# Patient Record
Sex: FEMALE | Race: BLACK OR AFRICAN AMERICAN | Employment: OTHER | URBAN - METROPOLITAN AREA
[De-identification: names, ages, dates, MRNs, and addresses within clinical notes are randomized per-mention and may not be internally consistent; named-entity substitution may affect disease eponyms.]

---

## 2017-01-02 ENCOUNTER — TELEPHONE (OUTPATIENT)
Dept: FAMILY MEDICINE CLINIC | Age: 67
End: 2017-01-02

## 2017-01-02 DIAGNOSIS — N18.30 CKD (CHRONIC KIDNEY DISEASE) STAGE 3, GFR 30-59 ML/MIN (HCC): Primary | ICD-10-CM

## 2017-01-02 NOTE — TELEPHONE ENCOUNTER
Lipids excellent. BS normal, non diabetes range. Great job, keep up the good work. Rest of lipids all ok as well except her renal insufficiency has worsened from last few checks (creatinine has steadily been increasing over the past few years and has jumped more significantly in the past year, at its highest now and considered stage 3 chronic kidney disease). I would like to refer her to nephrology to establish care. May notify [facility] after r/w pt. I have placed order.   Patient can see me for her annual fasting CPE 1 yr and follow up sooner in the interim pending nephrology eval.

## 2017-02-27 ENCOUNTER — HOSPITAL ENCOUNTER (OUTPATIENT)
Dept: ULTRASOUND IMAGING | Age: 67
Discharge: HOME | End: 2017-02-27
Attending: INTERNAL MEDICINE
Payer: MEDICARE

## 2017-02-27 DIAGNOSIS — N18.30 CHRONIC KIDNEY DISEASE, STAGE III (MODERATE) (HCC): ICD-10-CM

## 2017-02-27 PROCEDURE — 76770 US EXAM ABDO BACK WALL COMP: CPT

## 2017-08-04 ENCOUNTER — ED HISTORICAL/CONVERTED ENCOUNTER (OUTPATIENT)
Dept: OTHER | Age: 67
End: 2017-08-04

## 2017-10-12 ENCOUNTER — OFFICE VISIT (OUTPATIENT)
Dept: FAMILY MEDICINE CLINIC | Age: 67
End: 2017-10-12

## 2017-10-12 VITALS
SYSTOLIC BLOOD PRESSURE: 138 MMHG | BODY MASS INDEX: 34.53 KG/M2 | OXYGEN SATURATION: 98 % | DIASTOLIC BLOOD PRESSURE: 70 MMHG | HEIGHT: 70 IN | HEART RATE: 69 BPM | TEMPERATURE: 98.2 F | WEIGHT: 241.2 LBS | RESPIRATION RATE: 14 BRPM

## 2017-10-12 DIAGNOSIS — M25.551 PAIN OF RIGHT HIP JOINT: Primary | ICD-10-CM

## 2017-10-12 DIAGNOSIS — G89.29 CHRONIC RIGHT-SIDED LOW BACK PAIN WITHOUT SCIATICA: ICD-10-CM

## 2017-10-12 DIAGNOSIS — E66.9 OBESITY (BMI 30.0-34.9): ICD-10-CM

## 2017-10-12 DIAGNOSIS — Z23 ENCOUNTER FOR IMMUNIZATION: ICD-10-CM

## 2017-10-12 DIAGNOSIS — M54.50 CHRONIC RIGHT-SIDED LOW BACK PAIN WITHOUT SCIATICA: ICD-10-CM

## 2017-10-12 DIAGNOSIS — M79.651 PAIN OF RIGHT THIGH: ICD-10-CM

## 2017-10-12 NOTE — PROGRESS NOTES
1. Have you been to the ER, urgent care clinic since your last visit? Hospitalized since your last visit? No    2. Have you seen or consulted any other health care providers outside of the [address] [place] [address] since your last visit? Include any pap smears or colon screening. Podiatrist Dr. Precious Ogden 6/2017 for calluses. Chief Complaint   Patient presents with    Leg Pain     right thigh pain for years- has been getting worse recently, pain comes and goes.  Abdominal Pain     umbilical pain for the last couple of months, pain comes and goes. Not fasting    Eye exam done 12/13/2016 with Dr. Marlon Mccann. Colonoscopy done 4/2017 at [facility].

## 2017-10-12 NOTE — PROGRESS NOTES
After obtaining consent, and per orders of Dr. Bronson Olvera, injection of influenza given by Taj Laird LPN. Patient instructed to remain in clinic for 20 minutes afterwards, and to report any adverse reaction to me immediately. Patient did not have any adverse reactions during this office visit.

## 2017-10-12 NOTE — MR AVS SNAPSHOT
Visit Information Date & Time Provider Department Dept. Phone Encounter #  
 10/12/2017  8:45 AM Darrius Oliveros, [address] [phone] [id number] Follow-up Instructions Return in about 2 months (around 12/12/2017) for as planned with PCP. Your Appointments 12/15/2017  9:00 AM  
COMPLETE PHYSICAL with Sharlene Palafox MD  
[facility]) Appt Note: complete physical  
 [address] [phone]  
[phone]  
  
   
 [address] [phone] Upcoming Health Maintenance Date Due  
 GLAUCOMA SCREENING Q2Y 12/17/2016 COLONOSCOPY 3/1/2017 INFLUENZA AGE 9 TO ADULT 8/1/2017 MEDICARE YEARLY EXAM 12/10/2017 BREAST CANCER SCRN MAMMOGRAM 11/4/2018 DTaP/Tdap/Td series (2 - Td) 12/9/2026 Allergies as of 10/12/2017  Review Complete On: 10/12/2017 By: Darrius Oliveros MD  
  
 Severity Noted Reaction Type Reactions Codeine  08/16/2010    Itching Current Immunizations  Reviewed on 10/5/2017 Name Date Hep B Vaccine 6/1/2013, 2/1/2013, 1/1/2013 Influenza High Dose Vaccine PF 10/12/2017, 12/9/2016, 12/8/2015, 12/29/2014 Influenza Vaccine 11/1/2013, 12/28/2012 Pneumococcal Conjugate (PCV-13) 12/21/2016 Pneumococcal Polysaccharide (PPSV-23) 12/29/2014 Zoster Vaccine, Live 1/5/2016 Not reviewed this visit You Were Diagnosed With   
  
 Codes Comments Pain of right hip joint    -  Primary ICD-10-CM: M25.551 ICD-9-CM: 719.45 Chronic right-sided low back pain without sciatica     ICD-10-CM: M54.5, G89.29 ICD-9-CM: 724.2, 338.29 Pain of right thigh     ICD-10-CM: M79.651 ICD-9-CM: 729.5 Obesity (BMI 30.0-34.9)     ICD-10-CM: B70.1 ICD-9-CM: 278.00 Encounter for immunization     ICD-10-CM: T69 ICD-9-CM: V03.89 Vitals BP Pulse Temp Resp Height(growth percentile) Weight(growth percentile) 146/71 (BP 1 Location: Left arm, BP Patient Position: Sitting) 69 98.2 °F (36.8 °C) (Oral) 14 5' 10\" (1.778 m) 241 lb 3.2 oz (109.4 kg) SpO2 BMI OB Status Smoking Status 98% 34.61 kg/m2 Hysterectomy Never Smoker Vitals History BMI and BSA Data Body Mass Index Body Surface Area  
 34.61 kg/m 2 2.32 m 2 Preferred Pharmacy Pharmacy Name Phone [facility] [address] Your Updated Medication List  
  
   
This list is accurate as of: 10/12/17 10:01 AM.  Always use your most recent med list.  
  
  
  
  
 CALCIUM 500 + D (D3) PO Take  by mouth two (2) times a day. FISH OIL 1,000 mg Cap Generic drug:  omega-3 fatty acids-vitamin e Take  by mouth two (2) times a day. FLAXSEED OIL PO Take 1 Cap by mouth two (2) times a day. patricia (Zingiber officinalis) 550 mg Cap Take 550 mg by mouth two (2) times a day. magnesium 500 mg Tab Take  by mouth nightly. multivitamin tablet Commonly known as:  ONE A DAY Take 1 Tab by mouth daily. nystatin topical cream  
Commonly known as:  MYCOSTATIN Apply  to affected area daily. Per Derm Dr Vaishnavi Corrales for submammary intertrigo  Indications: under Breast  
  
 triamcinolone acetonide 0.1 % topical cream  
Commonly known as:  KENALOG Apply  to affected area daily. Per Derm Dr Vaishnavi Corrales for submammary intertrigo  Indications: at bedtime-under breast  
  
 VITAMIN C 1,000 mg tablet Generic drug:  ascorbic acid (vitamin C) Take  by mouth. VITAMIN D 5,000 unit Tab tablet Generic drug:  cholecalciferol (VITAMIN D3) Take  by mouth daily. Since 2008 WHEY PROTEIN 25 gram-140 kcal/34 gram Powd Generic drug:  Amino Acids-Whey Pro Con & Iso Take  by mouth. We Performed the Following ADMIN INFLUENZA VIRUS VAC [ [facility]] INFLUENZA VIRUS VACCINE, HIGH DOSE SEASONAL, PRESERVATIVE FREE [35165 CPT(R)] REFERRAL TO PHYSICAL THERAPY [[facility] Custom] Comments:  
 eval and treat right low back and hip pain; plans to go outside of [facility] Follow-up Instructions Return in about 2 months (around 12/12/2017) for as planned with PCP. To-Do List   
 10/12/2017 Imaging:  XR HIP RT W OR WO PELV 2-3 VWS   
  
 10/12/2017 Imaging:  XR SPINE LUMB 2 OR 3 V Referral Information Referral ID Referred By [facility]  
  
 [id number] Visits Status Start Date End Date 1 New Request 10/12/17 10/12/18 If your referral has a status of pending review or denied, additional information will be sent to support the outcome of this decision. Patient Instructions Vaccine Information Statement Influenza (Flu) Vaccine (Inactivated or Recombinant): What you need to know Many Vaccine Information Statements are available in Italian and other languages. See www.immunize.org/vis Hojas de Información Sobre Vacunas están disponibles en Español y en muchos otros idiomas. Visite www.immunize.org/vis 1. Why get vaccinated? Influenza (flu) is a contagious disease that spreads around the [place] every year, usually between October and May. Flu is caused by influenza viruses, and is spread mainly by coughing, sneezing, and close contact. Anyone can get flu. Flu strikes suddenly and can last several days. Symptoms vary by age, but can include: 
 fever/chills  sore throat  muscle aches  fatigue  cough  headache  runny or stuffy nose Flu can also lead to pneumonia and blood infections, and cause diarrhea and seizures in children. If you have a medical condition, such as heart or lung disease, flu can make it worse. Flu is more dangerous for some people.  Infants and young children, people 72years of age and older, pregnant women, and people with certain health conditions or a weakened immune system are at greatest risk. Each year thousands of people in the [facility] die from flu, and many more are hospitalized. Flu vaccine can: 
 keep you from getting flu, 
 make flu less severe if you do get it, and 
 keep you from spreading flu to your family and other people. 2. Inactivated and recombinant flu vaccines A dose of flu vaccine is recommended every flu season. Children 6 months through 6years of age may need two doses during the same flu season. Everyone else needs only one dose each flu season. Some inactivated flu vaccines contain a very small amount of a mercury-based preservative called thimerosal. Studies have not shown thimerosal in vaccines to be harmful, but flu vaccines that do not contain thimerosal are available. There is no live flu virus in flu shots. They cannot cause the flu. There are many flu viruses, and they are always changing. Each year a new flu vaccine is made to protect against three or four viruses that are likely to cause disease in the upcoming flu season. But even when the vaccine doesnt exactly match these viruses, it may still provide some protection Flu vaccine cannot prevent: 
 flu that is caused by a virus not covered by the vaccine, or 
 illnesses that look like flu but are not. It takes about 2 weeks for protection to develop after vaccination, and protection lasts through the flu season. 3. Some people should not get this vaccine Tell the person who is giving you the vaccine:  If you have any severe, life-threatening allergies. If you ever had a life-threatening allergic reaction after a dose of flu vaccine, or have a severe allergy to any part of this vaccine, you may be advised not to get vaccinated. Most, but not all, types of flu vaccine contain a small amount of egg protein.  If you ever had Guillain-Barré Syndrome (also called GBS). Some people with a history of GBS should not get this vaccine. This should be discussed with your doctor.  If you are not feeling well. It is usually okay to get flu vaccine when you have a mild illness, but you might be asked to come back when you feel better. 4. Risks of a vaccine reaction With any medicine, including vaccines, there is a chance of reactions. These are usually mild and go away on their own, but serious reactions are also possible. Most people who get a flu shot do not have any problems with it. Minor problems following a flu shot include:  
 soreness, redness, or swelling where the shot was given  hoarseness  sore, red or itchy eyes  cough  fever  aches  headache  itching  fatigue If these problems occur, they usually begin soon after the shot and last 1 or 2 days. More serious problems following a flu shot can include the following:  There may be a small increased risk of Guillain-Barré Syndrome (GBS) after inactivated flu vaccine. This risk has been estimated at 1 or 2 additional cases per million people vaccinated. This is much lower than the risk of severe complications from flu, which can be prevented by flu vaccine.  Young children who get the flu shot along with pneumococcal vaccine (PCV13) and/or DTaP vaccine at the same time might be slightly more likely to have a seizure caused by fever. Ask your doctor for more information. Tell your doctor if a child who is getting flu vaccine has ever had a seizure. Problems that could happen after any injected vaccine:  People sometimes faint after a medical procedure, including vaccination. Sitting or lying down for about 15 minutes can help prevent fainting, and injuries caused by a fall. Tell your doctor if you feel dizzy, or have vision changes or ringing in the ears.  
 
 Some people get severe pain in the shoulder and have difficulty moving the arm where a shot was given. This happens very rarely.  Any medication can cause a severe allergic reaction. Such reactions from a vaccine are very rare, estimated at about 1 in a million doses, and would happen within a few minutes to a few hours after the vaccination. As with any medicine, there is a very remote chance of a vaccine causing a serious injury or death. The safety of vaccines is always being monitored. For more information, visit: www.cdc.gov/vaccinesafety/ 
 
5. What if there is a serious reaction? What should I look for?  Look for anything that concerns you, such as signs of a severe allergic reaction, very high fever, or unusual behavior. Signs of a severe allergic reaction can include hives, swelling of the face and throat, difficulty breathing, a fast heartbeat, dizziness, and weakness  usually within a few minutes to a few hours after the vaccination. What should I do?  If you think it is a severe allergic reaction or other emergency that cant wait, call 9-1-1 and get the person to the nearest hospital. Otherwise, call your doctor.  Reactions should be reported to the Vaccine Adverse Event Reporting System (VAERS). Your doctor should file this report, or you can do it yourself through  the VAERS web site at www.vaers. [facility].gov, or by calling 6-896.214.4114. [employer] does not give medical advice. 6. The National Vaccine Injury Compensation Program 
 
The [facility] Vaccine Injury Compensation Program (VICP) is a federal program that was created to compensate people who may have been injured by certain vaccines. Persons who believe they may have been injured by a vaccine can learn about the program and about filing a claim by calling 2-625.535.8054 or visiting the [employer] website at www.[facility].gov/vaccinecompensation. There is a time limit to file a claim for compensation. 7. How can I learn more?  Ask your healthcare provider. He or she can give you the vaccine package insert or suggest other sources of information.  Call your local or state health department.  Contact the Centers for Disease Control and Prevention (CDC): 
- Call 9-669.203.1913 (1-800-CDC-INFO) or 
- Visit CDCs website at www.cdc.gov/flu Vaccine Information Statement Inactivated Influenza Vaccine 8/7/2015 
42 LIANNE Phelps 164CD-15 Department of [facility] and [employer] Centers for Disease Control and Prevention Office Use Only Back Stretches: Exercises Your Care Instructions Here are some examples of exercises for stretching your back. Start each exercise slowly. Ease off the exercise if you start to have pain. Your doctor or physical therapist will tell you when you can start these exercises and which ones will work best for you. How to do the exercises Overhead stretch 1. Stand comfortably with your feet shoulder-width apart. 2. Looking straight ahead, raise both arms over your head and reach toward the ceiling. Do not allow your head to tilt back. 3. Hold for 15 to 30 seconds, then lower your arms to your sides. 4. Repeat 2 to 4 times. Side stretch 1. Stand comfortably with your feet shoulder-width apart. 2. Raise one arm over your head, and then lean to the other side. 3. Slide your hand down your leg as you let the weight of your arm gently stretch your side muscles. Hold for 15 to 30 seconds. 4. Repeat 2 to 4 times on each side. Press-up 1. Lie on your stomach, supporting your body with your forearms. 2. Press your elbows down into the floor to raise your upper back. As you do this, relax your stomach muscles and allow your back to arch without using your back muscles. As your press up, do not let your hips or pelvis come off the floor. 3. Hold for 15 to 30 seconds, then relax. 4. Repeat 2 to 4 times.  
Relax and rest 
 
 1. Lie on your back with a rolled towel under your neck and a pillow under your knees. Extend your arms comfortably to your sides. 2. Relax and breathe normally. 3. Remain in this position for about 10 minutes. 4. If you can, do this 2 or 3 times each day. Follow-up care is a key part of your treatment and safety. Be sure to make and go to all appointments, and call your doctor if you are having problems. It's also a good idea to know your test results and keep a list of the medicines you take. Where can you learn more? Go to http://dong-libby.info/. Enter M982 in the search box to learn more about \"Back Stretches: Exercises. \" Current as of: March 21, 2017 Content Version: 11.3 © 1241-0136 Healthwise, Incorporated. Care instructions adapted under license by [employer] (which disclaims liability or warranty for this information). If you have questions about a medical condition or this instruction, always ask your healthcare professional. [place] [address] any warranty or liability for your use of this information. Introducing [facility]! Enrique Muñoz introduces [employer] patient portal. Now you can access parts of your medical record, email your doctor's office, and request medication refills online. 1. In your internet browser, go to [email or web address] 2. Click on the First Time User? Click Here link in the Sign In box. You will see the New Member Sign Up page. 3. Enter your [employer] Access Code exactly as it appears below. You will not need to use this code after youve completed the sign-up process. If you do not sign up before the expiration date, you must request a new code. · [employer] Access Code: [email or web address] Expires: 1/10/2018 10:01 AM 
 
4. Enter the last four digits of your Social Security Number (xxxx) and Date of Birth (mm/dd/yyyy) as indicated and click Submit.  You will be taken to the next sign-up page. 5. Create a [employer] ID. This will be your [employer] login ID and cannot be changed, so think of one that is secure and easy to remember. 6. Create a [employer] password. You can change your password at any time. 7. Enter your Password Reset Question and Answer. This can be used at a later time if you forget your password. 8. Enter your e-mail address. You will receive e-mail notification when new information is available in [address]. 9. Click Sign Up. You can now view and download portions of your medical record. 10. Click the Download Summary menu link to download a portable copy of your medical information. If you have questions, please visit the Frequently Asked Questions section of the [employer] website. Remember, [employer] is NOT to be used for urgent needs. For medical emergencies, dial 911. Now available from your iPhone and Android! Please provide this summary of care documentation to your next provider. Your primary care clinician is listed as RUSLAN CHOPRA. If you have any questions after today's visit, please call [phone].

## 2017-10-12 NOTE — PROGRESS NOTES
Sana Courtney is a 78year old female who presents for an acute visit with chief complaint of right leg pain. Onset one year ago, gradually worsening over the past few months. Location: pain is worse in right thigh but can radiate into right groin and right buttock. Aching pain. Aggravated by longs periods of ambulating activity. Alleviated by rest. No treatments needed to date. Previous lower back problems: none. Previous workup: none. Denies trauma, recent illnesses, fevers, unexplained weight changes, chest pain, upper back pain, or abdominal pain. Denies limb weakness, numbness or tingling, loss of bowel or bladder. Gait not affected. Retired. Remains very active; lifts weights multiple days per week although she has been very cautious with lower body weight training over the past few months. Also walks and performs aerobic activities multiple days a week. She does try to eat a healthy diet however, she is drinking large amounts of fresh juice per day. Current Outpatient Prescriptions   Medication Sig Dispense Refill    XIANG ROOT (XIANG, ZINGIBER OFFICINALIS,) 550 mg cap Take 550 mg by mouth two (2) times a day.  triamcinolone acetonide (KENALOG) 0.1 % topical cream Apply  to affected area daily. Per Derm Dr Lester Finch for submammary intertrigo  Indications: at bedtime-under breast      nystatin (MYCOSTATIN) topical cream Apply  to affected area daily. Per Derm Dr Lester Finch for submammary intertrigo  Indications: under Breast      Amino Acids-Whey Pro Con & Iso (WHEY PROTEIN) 25 gram-140 kcal/34 gram powd Take  by mouth.  magnesium 500 mg Tab Take  by mouth nightly.  ascorbic acid (VITAMIN C) 1,000 mg tablet Take  by mouth.  FLAXSEED OIL PO Take 1 Cap by mouth two (2) times a day.  Cholecalciferol, Vitamin D3, (VITAMIN D) 5,000 unit Tab Take  by mouth daily. Since 2008      multivitamin (ONE A DAY) tablet Take 1 Tab by mouth daily.       omega-3 fatty acids-vitamin e (FISH OIL) 1,000 mg Cap Take  by mouth two (2) times a day.  CALCIUM CARBONATE/VITAMIN D3 (CALCIUM 500 + D, D3, PO) Take  by mouth two (2) times a day. Allergies   Allergen Reactions    Codeine Itching         ROS:      General ROS: negative for - chills, fatigue, fever, night sweats, sleep disturbance or unexplained weight changes. Psychological ROS: negative for - anxiety or depression  Respiratory ROS: negative for - cough, shortness of breath or wheezing  Gastrointestinal ROS: no abdominal pain, change in bowel habits, or black or bloody stools  Genito-Urinary ROS: no dysuria, trouble voiding, or hematuria  Musculoskeletal ROS: positive for - Back pain - see HPI  Neurological ROS: negative for - bowel and bladder control changes, dizziness, gait disturbance, headaches, impaired coordination/balance, numbness/tingling, seizures, speech problems or weakness  Dermatological ROS: negative for - bruising, rash       Physical Exam:     Visit Vitals    /70    Pulse 69    Temp 98.2 °F (36.8 °C) (Oral)    Resp 14    Ht 5' 10\" (1.778 m)    Wt 241 lb 3.2 oz (109.4 kg)    SpO2 98%    BMI 34.61 kg/m2      Physical Exam:  General: Patient is a 78y.o. year-old female, awake and alert. Answers questions appropriately and is sitting comfortably on the exam table, no distress. Heart: S1S2. No murmur, gallop, or rub. Lungs: Clear to auscultation bilaterally. Abdomen: Bowel sounds present in 4 quadrants, no abdominal bruits. Soft, non-tender, no masses or hepatosplenomegaly. Back: No tenderness upon palpation to the cervical, thoracic, lumbar, or sacral region of the spine. No ecchymosis or swelling noted. Full ROM. Negative straight leg raise. Extrem: No edema present. Peripheral pulses intact. No tenderness over SI joint or greater trochanter. Mild pain to deep palpation upper groin. Pain in right hip and groin with internal rotation of right leg.    Neuro: Oriented x3. 5/5 strength in bilateral lower extremities. Normal bulk and tone. DTR's  2+/4 in bilaterally lower extremities, sensation present and symmetrical. Gait normal.        Assessment/ Plan:     Diagnoses and all orders for this visit:    1. Pain of right hip joint:   -     XR HIP RT W OR WO PELV 2-3 VWS; Future  -     REFERRAL TO PHYSICAL THERAPY    2. Chronic right-sided low back pain without sciatica  -     XR SPINE LUMB 2 OR 3 V; Future  -     REFERRAL TO PHYSICAL THERAPY    3. Pain of right thigh  -     REFERRAL TO PHYSICAL THERAPY    4. Obesity (BMI 30.0-34.9)    - Discussed diet modifications and reduction of juice intake to help with weight loss. 5. Encounter for immunization  -     Influenza virus vaccine ([address]) 72 years and older (40472)  -     Administration fee () for Medicare insured patients        Discussed proper body mechanics. Encouraged routine stretching as well as the avoidance of heavy lifting until symptoms improve. Follow-up Disposition:  Return in about 2 months (around 12/12/2017) for as planned with PCP.

## 2017-10-12 NOTE — PROGRESS NOTES
Emanuel Davis [address] [facility] [facility]  [phone]             Date of visit: 10/12/2017   Subjective:      History obtained from:  the patient. Diana Dumas is a 78 y.o. female who presents today for right leg and hip pain for 1 year getting worse over time  Intermittent, aching pain  Aggravated by standing for long periods  Hurts worse in mid thigh, radiates to lateral hip or low back    Not taking meds or treatments for it  Will rest at times  Very physically fit, doing a lot of weight training but has been taking it easy on lower body past few months  Walks daily for 45 min, doesn't seem to hurt more when exercising  No recent illness  No trauma or unexplained weight loss    Patient Active Problem List    Diagnosis Date Noted    CKD (chronic kidney disease) stage 3, GFR 30-59 ml/min 01/02/2017    Acute onset of severe vertigo 12/09/2016    Submammary Candidal Intertrigo 12/09/2016    Migraine without aura 12/09/2016    Advance care planning 12/08/2015    Sleep related leg cramps 12/29/2011    Atypical ductal hyperplasia of left breast, 2005 12/29/2011    Lipoma of right breast, 2000 12/29/2011    Cataract, right eye 12/29/2011    S/P colonoscopy 2009, 2014 11/09/2010    Retinal hemorrhage, right eye 08/16/2010    Fibrocystic breasts 08/16/2010    Postmenopause s/p hysterectomy s/p HRT [id number] 08/16/2010    H/O Mild Chronic Renal Insufficiency 08/16/2010    Strong FHx: Breast Cancer 08/16/2010     Current Outpatient Prescriptions   Medication Sig Dispense Refill    XIANG ROOT (XIANG, ZINGIBER OFFICINALIS,) 550 mg cap Take 550 mg by mouth two (2) times a day.  triamcinolone acetonide (KENALOG) 0.1 % topical cream Apply  to affected area daily. Per Derm Dr Nayla Angela for submammary intertrigo  Indications: at bedtime-under breast      nystatin (MYCOSTATIN) topical cream Apply  to affected area daily.  Per Derm Dr Nayla Angela for submammary intertrigo  Indications: under Breast      Amino Acids-Whey Pro Con & Iso (WHEY PROTEIN) 25 gram-140 kcal/34 gram powd Take  by mouth.  magnesium 500 mg Tab Take  by mouth nightly.  ascorbic acid (VITAMIN C) 1,000 mg tablet Take  by mouth.  FLAXSEED OIL PO Take 1 Cap by mouth two (2) times a day.  Cholecalciferol, Vitamin D3, (VITAMIN D) 5,000 unit Tab Take  by mouth daily. Since 2008      multivitamin (ONE A DAY) tablet Take 1 Tab by mouth daily.  omega-3 fatty acids-vitamin e (FISH OIL) 1,000 mg Cap Take  by mouth two (2) times a day.  CALCIUM CARBONATE/VITAMIN D3 (CALCIUM 500 + D, D3, PO) Take  by mouth two (2) times a day. Allergies   Allergen Reactions    Codeine Itching     Past Medical History:   Diagnosis Date    Acute onset of severe vertigo 12/9/2016 9-21-16 [facility]: labs, cardiac workup and brain MRI normal; dx w/ inner ear; resolved    Advance care planning 12/8/2015    Advance care planning 12/8/2015    Initial ACP discussion. AMD form reviewed and copy provided.  NN to d/w pt 12-8-15    Atypical ductal hyperplasia of left breast, 2005 12/29/2011    Cataract, right eye 12/29/2011    CKD (chronic kidney disease) stage 3, GFR 30-59 ml/min 1/2/2017    Referred to nephrology 1-2017    Fibrocystic breasts 8/16/2010    H/O Chronic Renal Insufficiency 8/16/2010    Lipoma of right breast, 2000 12/29/2011    Migraine without aura 12/9/2016    In her 30's: triggered by cheese, cow's milk, certain sweets/chocolate    Postmenopause s/p hysterectomy s/p HRT [id number] 8/16/2010    Retinal hemorrhage, right eye 8/16/2010    S/P colonoscopy 2009, 2014 11/9/2010    [place]; F/U 3 yrs recommended d/t poor study/incomplete; had Barium Enema study instead which was normal; Colonoscopy 3/2014 Dr Marcellus Mccormack, polyp    Sleep related leg cramps 12/29/2011    Strong FHx: Breast Cancer 8/16/2010    Submammary Candidal Intertrigo 12/9/2016    Dermatologist in [place], Dr Kaitlynn Cary    Vertigo 2016     Past Surgical History:   Procedure Laterality Date    COLONOSCOPY  ~    recommended f/u 5 yrs    HX BREAST BIOPSY  2005, Dr Tracie Zambrano    left breast, atypical ductal hyperplasia; Dr Tracie Zambrano, [address]    HX COLONOSCOPY  3/2014    Colon polyp, Dr Juan Chauhan, f/u 3 yrs    HX CYNTHIA AND BSO      AUB, Fibroids    TAYA MAMMOGRAM SCREENING BILATERAL  Routinely at [address]    q6months d/t strong FHX & h/o ADH; Dr Tracie Zambrano    PAP, LIQUID BASED, MANUAL SCREEN  2010    normal, [address]      right 5th toe     Family History   Problem Relation Age of Onset    Diabetes Mother     Stroke Mother     Hypertension Mother     High Cholesterol Mother     Hypertension Father     Heart Disease Father     Seizures Maternal Grandmother     Diabetes Maternal Grandfather     Heart Disease Maternal Grandfather     Ovarian Cancer Sister     Breast Cancer Paternal Aunt       in her 42's    Breast Cancer Daughter      dx age 32; my pt; B Mastectomy     Social History   Substance Use Topics    Smoking status: Never Smoker    Smokeless tobacco: Never Used    Alcohol use No      Comment: none      Social History     Social History Narrative     since 55; Native of [place] [facility]; Prior PCP in [place], Dr Jonathon Lam; Lives in [place], lives alone in her townhouse. Daughter nearby. Does her own ADL's. Drives. Manages her own medications and financial affairs. Patient completed Advanced Directive , copy in chart.                          Review of Systems  Gen: denies fever   Card: denies chest pain     Objective:     Vitals:    10/12/17 0915 10/12/17 0922 10/12/17 1009   BP: 144/70 146/71 138/70   Pulse: 69     Resp: 14     Temp: 98.2 °F (36.8 °C)     TempSrc: Oral     SpO2: 98%     Weight: 241 lb 3.2 oz (109.4 kg)     Height: 5' 10\" (1.778 m)       Body mass index is 34.61 kg/(m^2). General: stated age, obese and in NAD  MSK: mildly tender over right SI and right lumbar paraspinous muscles, normal ROM hip but some pain with internal rotation, no tenderness of greater trochanter; straight leg raise: neg bilaterally   Neuro: 5/5 strength and 2+ patellar reflexes in bilateral lower extermities  Ext:  No edema noted. Skin:  Normal. and no rash or abnormalities   Psych: alert and oriented to person, place, time and situation and Speech: appropriate quality, quantity and organization of sentences     Assessment/Plan:       ICD-10-CM ICD-9-CM    1. Pain of right hip joint M25.551 719.45 XR HIP RT W OR WO PELV 2-3 VWS      REFERRAL TO PHYSICAL THERAPY   2. Chronic right-sided low back pain without sciatica M54.5 724.2 XR SPINE LUMB 2 OR 3 V    G89.29 338.29 REFERRAL TO PHYSICAL THERAPY   3. Pain of right thigh M79.651 729.5 REFERRAL TO PHYSICAL THERAPY   4. Obesity (BMI 30.0-34. 9) E66.9 278.00    5. Encounter for immunization Z23 V03.89 INFLUENZA VIRUS VACCINE, HIGH DOSE SEASONAL, PRESERVATIVE FREE      ADMIN INFLUENZA VIRUS VAC        Orders Placed This Encounter    XR HIP RT W OR WO PELV 2-3 VWS    XR SPINE LUMB 2 OR 3 V    Influenza virus vaccine ([address]) 65 years and older (65)    REFERRAL TO [address] fee () for Medicare insured patients       Suspect arthritis back, maybe in hip as well  Do xrays  Try physical therapy as pain has been ongoing  Really encouraged weight loss  Has been drinking a lot of juice; advised spreading out her  of juice over 3 days instead of 1 day and drinking more water  Already does a lot of exercise, avoiding activity that makes her pain worse  She is doing ok without meds, not adding anything    Discussed the diagnosis and plan and she expressed understanding. Follow-up Disposition:  Return in about 2 months (around 12/12/2017) for as planned with PCP.     Tuan Francis MD

## 2017-10-12 NOTE — PATIENT INSTRUCTIONS
Vaccine Information Statement    Influenza (Flu) Vaccine (Inactivated or Recombinant): What you need to know    Many Vaccine Information Statements are available in [place] and other languages. See www.immunize.org/vis  Hojas de Información Sobre Vacunas están disponibles en Español y en muchos otros idiomas. Visite www.immunize.org/vis    1. Why get vaccinated? Influenza (flu) is a contagious disease that spreads around the [place] every year, usually between October and May. Flu is caused by influenza viruses, and is spread mainly by coughing, sneezing, and close contact. Anyone can get flu. Flu strikes suddenly and can last several days. Symptoms vary by age, but can include:   fever/chills   sore throat   muscle aches   fatigue   cough   headache    runny or stuffy nose    Flu can also lead to pneumonia and blood infections, and cause diarrhea and seizures in children. If you have a medical condition, such as heart or lung disease, flu can make it worse. Flu is more dangerous for some people. Infants and young children, people 72years of age and older, pregnant women, and people with certain health conditions or a weakened immune system are at greatest risk. Each year thousands of people in the [facility] die from flu, and many more are hospitalized. Flu vaccine can:   keep you from getting flu,   make flu less severe if you do get it, and   keep you from spreading flu to your family and other people. 2. Inactivated and recombinant flu vaccines    A dose of flu vaccine is recommended every flu season. Children 6 months through 6years of age may need two doses during the same flu season. Everyone else needs only one dose each flu season.        Some inactivated flu vaccines contain a very small amount of a mercury-based preservative called thimerosal. Studies have not shown thimerosal in vaccines to be harmful, but flu vaccines that do not contain thimerosal are available. There is no live flu virus in flu shots. They cannot cause the flu. There are many flu viruses, and they are always changing. Each year a new flu vaccine is made to protect against three or four viruses that are likely to cause disease in the upcoming flu season. But even when the vaccine doesnt exactly match these viruses, it may still provide some protection    Flu vaccine cannot prevent:   flu that is caused by a virus not covered by the vaccine, or   illnesses that look like flu but are not. It takes about 2 weeks for protection to develop after vaccination, and protection lasts through the flu season. 3. Some people should not get this vaccine    Tell the person who is giving you the vaccine:     If you have any severe, life-threatening allergies. If you ever had a life-threatening allergic reaction after a dose of flu vaccine, or have a severe allergy to any part of this vaccine, you may be advised not to get vaccinated. Most, but not all, types of flu vaccine contain a small amount of egg protein.  If you ever had Guillain-Barré Syndrome (also called GBS). Some people with a history of GBS should not get this vaccine. This should be discussed with your doctor.  If you are not feeling well. It is usually okay to get flu vaccine when you have a mild illness, but you might be asked to come back when you feel better. 4. Risks of a vaccine reaction    With any medicine, including vaccines, there is a chance of reactions. These are usually mild and go away on their own, but serious reactions are also possible. Most people who get a flu shot do not have any problems with it.      Minor problems following a flu shot include:    soreness, redness, or swelling where the shot was given     hoarseness   sore, red or itchy eyes   cough   fever   aches   headache   itching   fatigue  If these problems occur, they usually begin soon after the shot and last 1 or 2 days. More serious problems following a flu shot can include the following:     There may be a small increased risk of Guillain-Barré Syndrome (GBS) after inactivated flu vaccine. This risk has been estimated at 1 or 2 additional cases per million people vaccinated. This is much lower than the risk of severe complications from flu, which can be prevented by flu vaccine.  Young children who get the flu shot along with pneumococcal vaccine (PCV13) and/or DTaP vaccine at the same time might be slightly more likely to have a seizure caused by fever. Ask your doctor for more information. Tell your doctor if a child who is getting flu vaccine has ever had a seizure. Problems that could happen after any injected vaccine:      People sometimes faint after a medical procedure, including vaccination. Sitting or lying down for about 15 minutes can help prevent fainting, and injuries caused by a fall. Tell your doctor if you feel dizzy, or have vision changes or ringing in the ears.  Some people get severe pain in the shoulder and have difficulty moving the arm where a shot was given. This happens very rarely.  Any medication can cause a severe allergic reaction. Such reactions from a vaccine are very rare, estimated at about 1 in a million doses, and would happen within a few minutes to a few hours after the vaccination. As with any medicine, there is a very remote chance of a vaccine causing a serious injury or death. The safety of vaccines is always being monitored. For more information, visit: www.cdc.gov/vaccinesafety/    5. What if there is a serious reaction? What should I look for?  Look for anything that concerns you, such as signs of a severe allergic reaction, very high fever, or unusual behavior.     Signs of a severe allergic reaction can include hives, swelling of the face and throat, difficulty breathing, a fast heartbeat, dizziness, and weakness - usually within a few minutes to a few hours after the vaccination. What should I do?  If you think it is a severe allergic reaction or other emergency that cant wait, call 9-1-1 and get the person to the nearest hospital. Otherwise, call your doctor.  Reactions should be reported to the Vaccine Adverse Event Reporting System (VAERS). Your doctor should file this report, or you can do it yourself through  the VAERS web site at www.vaers. [facility].gov, or by calling 3-589.569.1487. VAERS does not give medical advice. 6. The National Vaccine Injury Compensation Program    The [facility] Vaccine Injury Compensation Program (VICP) is a federal program that was created to compensate people who may have been injured by certain vaccines. Persons who believe they may have been injured by a vaccine can learn about the program and about filing a claim by calling 5-312.276.7470 or visiting the [employer] website at www.[facility].gov/vaccinecompensation. There is a time limit to file a claim for compensation. 7. How can I learn more?  Ask your healthcare provider. He or she can give you the vaccine package insert or suggest other sources of information.  Call your local or state health department.  Contact the Centers for Disease Control and Prevention (CDC):  - Call 7-713.582.9124 (1-800-CDC-INFO) or  - Visit CDCs website at www.cdc.gov/flu    Vaccine Information Statement   Inactivated Influenza Vaccine   8/7/2015  42 LIANNE Keenan [id number]    Department of Health and Human Services  Centers for Disease Control and Prevention    Office Use Only       Back Stretches: Exercises  Your Care Instructions  Here are some examples of exercises for stretching your back. Start each exercise slowly. Ease off the exercise if you start to have pain. Your doctor or physical therapist will tell you when you can start these exercises and which ones will work best for you.   How to do the exercises  Overhead stretch    1. Stand comfortably with your feet shoulder-width apart. 2. Looking straight ahead, raise both arms over your head and reach toward the ceiling. Do not allow your head to tilt back. 3. Hold for 15 to 30 seconds, then lower your arms to your sides. 4. Repeat 2 to 4 times. Side stretch    1. Stand comfortably with your feet shoulder-width apart. 2. Raise one arm over your head, and then lean to the other side. 3. Slide your hand down your leg as you let the weight of your arm gently stretch your side muscles. Hold for 15 to 30 seconds. 4. Repeat 2 to 4 times on each side. Press-up    1. Lie on your stomach, supporting your body with your forearms. 2. Press your elbows down into the floor to raise your upper back. As you do this, relax your stomach muscles and allow your back to arch without using your back muscles. As your press up, do not let your hips or pelvis come off the floor. 3. Hold for 15 to 30 seconds, then relax. 4. Repeat 2 to 4 times. Relax and rest    1. Lie on your back with a rolled towel under your neck and a pillow under your knees. Extend your arms comfortably to your sides. 2. Relax and breathe normally. 3. Remain in this position for about 10 minutes. 4. If you can, do this 2 or 3 times each day. Follow-up care is a key part of your treatment and safety. Be sure to make and go to all appointments, and call your doctor if you are having problems. It's also a good idea to know your test results and keep a list of the medicines you take. Where can you learn more? Go to http://dong-libby.info/. Enter J404 in the search box to learn more about \"Back Stretches: Exercises. \"  Current as of: March 21, 2017  Content Version: 11.3  © 3444-7155 [employer]. Care instructions adapted under license by [employer] (which disclaims liability or warranty for this information).  If you have questions about a medical condition or this instruction, always ask your healthcare professional. [address] any warranty or liability for your use of this information.

## 2017-10-17 ENCOUNTER — TELEPHONE (OUTPATIENT)
Dept: FAMILY MEDICINE CLINIC | Age: 67
End: 2017-10-17

## 2017-10-18 NOTE — TELEPHONE ENCOUNTER
----- Message from Ara Hooker MD sent at 10/12/2017  5:57 PM EDT -----  Please let her know we see arthritis in her back but not in the hip joint.  I think the physical therapy will help    LM for return call

## 2017-10-18 NOTE — TELEPHONE ENCOUNTER
Patient is returning call from Janell Staton, contacted nurse no success. Tracy is requesting a call back.      Best call back # [phone] (M)

## 2017-12-15 ENCOUNTER — OFFICE VISIT (OUTPATIENT)
Dept: FAMILY MEDICINE CLINIC | Age: 67
End: 2017-12-15

## 2017-12-15 ENCOUNTER — HOSPITAL ENCOUNTER (OUTPATIENT)
Dept: LAB | Age: 67
Discharge: HOME | End: 2017-12-15
Payer: MEDICARE

## 2017-12-15 VITALS
TEMPERATURE: 98.8 F | RESPIRATION RATE: 18 BRPM | DIASTOLIC BLOOD PRESSURE: 72 MMHG | HEIGHT: 70 IN | OXYGEN SATURATION: 96 % | WEIGHT: 236.2 LBS | HEART RATE: 58 BPM | BODY MASS INDEX: 33.82 KG/M2 | SYSTOLIC BLOOD PRESSURE: 116 MMHG

## 2017-12-15 DIAGNOSIS — N18.30 CKD (CHRONIC KIDNEY DISEASE) STAGE 3, GFR 30-59 ML/MIN (HCC): ICD-10-CM

## 2017-12-15 DIAGNOSIS — Z00.00 ROUTINE GENERAL MEDICAL EXAMINATION AT A HEALTH CARE FACILITY: Primary | ICD-10-CM

## 2017-12-15 DIAGNOSIS — I10 BENIGN ESSENTIAL HYPERTENSION: ICD-10-CM

## 2017-12-15 PROCEDURE — 84443 ASSAY THYROID STIM HORMONE: CPT

## 2017-12-15 PROCEDURE — 82306 VITAMIN D 25 HYDROXY: CPT

## 2017-12-15 PROCEDURE — 85027 COMPLETE CBC AUTOMATED: CPT

## 2017-12-15 PROCEDURE — 80061 LIPID PANEL: CPT

## 2017-12-15 PROCEDURE — 81003 URINALYSIS AUTO W/O SCOPE: CPT

## 2017-12-15 PROCEDURE — 80053 COMPREHEN METABOLIC PANEL: CPT

## 2017-12-15 NOTE — PROGRESS NOTES
Yamileth Johansen is a 75 y.o. female and presents for annual Medicare Wellness Visit. Problem List: Reviewed with patient and discussed risk factors.     Patient Active Problem List   Diagnosis Code    Retinal hemorrhage, right eye H35.61    Fibrocystic breasts N60.19    Postmenopause s/p hysterectomy s/p HRT 7899-3563 Z78.0    H/O Mild Chronic Renal Insufficiency N28.9    Strong FHx: Breast Cancer Z80.3    S/P colonoscopy 2009, 2014 Z98.890    Sleep related leg cramps G47.62    Atypical ductal hyperplasia of left breast, 2005 N60.99    Lipoma of right breast, 2000 D17.1    Cataract, right eye H26.9    Advance care planning Z71.89    Acute onset of severe vertigo R42    Submammary Candidal Intertrigo B37.2    Migraine without aura G43.009    CKD (chronic kidney disease) stage 3, GFR 30-59 ml/min N18.3    Benign essential hypertension I10       Current medical providers:  Patient Care Team:  [address] [place] [address], MD as PCP - General    PSH: Reviewed with patient  Past Surgical History:   Procedure Laterality Date    COLONOSCOPY  ~2009    recommended f/u 5 yrs    HX BREAST BIOPSY  4/2005, Dr Jazmyne La    left breast, atypical ductal hyperplasia; Dr Jazmyne La, [address]    HX COLONOSCOPY  3/2014    Colon polyp, Dr Joao Davis, f/u 3 yrs    HX COLONOSCOPY  04/01/2017    \"normal\", repeat 10 yrs, Dr Paramjit King Left 11/14/2017    left lateral knee     HX CYNTHIA AND BSO  2002    AUB, Fibroids    TAYA MAMMOGRAM SCREENING BILATERAL  Routinely at [address]    q6months d/t strong FHX & h/o ADH; Dr Ken Delgado PAP, LIQUID BASED, MANUAL SCREEN  2/2010    normal, [address]  2008    right 5th toe        SH: Reviewed with patient  Social History   Substance Use Topics    Smoking status: Never Smoker    Smokeless tobacco: Never Used    Alcohol use No      Comment: none       FH: Reviewed with patient  Family History Problem Relation Age of Onset    Diabetes Mother     Stroke Mother     Hypertension Mother     High Cholesterol Mother     Hypertension Father     Heart Disease Father     Seizures Maternal Grandmother     Diabetes Maternal Grandfather     Heart Disease Maternal Grandfather     Ovarian Cancer Sister     Breast Cancer Paternal Aunt       in her 42's    Breast Cancer Daughter      dx age 32; my pt; B Mastectomy       Medications/Allergies: Reviewed with patient  Current Outpatient Prescriptions on File Prior to Visit   Medication Sig Dispense Refill    XIANG ROOT (XIANG, ZINGIBER OFFICINALIS,) 550 mg cap Take 550 mg by mouth two (2) times a day.  triamcinolone acetonide (KENALOG) 0.1 % topical cream Apply  to affected area daily. Per Derm Dr Jacques Graf for submammary intertrigo  Indications: at bedtime-under breast      nystatin (MYCOSTATIN) topical cream Apply  to affected area daily. Per Derm Dr Jacques Graf for submammary intertrigo  Indications: under Breast      Amino Acids-Whey Pro Con & Iso (WHEY PROTEIN) 25 gram-140 kcal/34 gram powd Take  by mouth.  magnesium 500 mg Tab Take  by mouth nightly.  ascorbic acid (VITAMIN C) 1,000 mg tablet Take 1,000 mg by mouth two (2) times a day.  FLAXSEED OIL PO Take 1 Cap by mouth two (2) times a day.  Cholecalciferol, Vitamin D3, (VITAMIN D) 5,000 unit Tab Take  by mouth daily. Since       multivitamin (ONE A DAY) tablet Take 1 Tab by mouth daily.  omega-3 fatty acids-vitamin e (FISH OIL) 1,000 mg Cap Take  by mouth two (2) times a day.  CALCIUM CARBONATE/VITAMIN D3 (CALCIUM 500 + D, D3, PO) Take  by mouth two (2) times a day. No current facility-administered medications on file prior to visit.        Allergies   Allergen Reactions    Codeine Itching       Objective:  Visit Vitals    /72 (BP 1 Location: Left arm, BP Patient Position: Sitting)    Pulse (!) 58    Temp 98.8 °F (37.1 °C) (Oral)    Resp 18    Ht 5' 10\" (1.778 m)    Wt 236 lb 3.2 oz (107.1 kg)    SpO2 96%    BMI 33.89 kg/m2    Body mass index is 33.89 kg/(m^2). Assessment of cognitive impairment: Alert and oriented x 3    Depression Screen:   PHQ over the last two weeks 12/9/2016   Little interest or pleasure in doing things Not at all   Feeling down, depressed or hopeless Not at all   Total Score PHQ 2 0       Fall Risk Assessment:    Fall Risk Assessment, last 12 mths 12/9/2016   Able to walk? Yes   Fall in past 12 months? No       Functional Ability:   Does the patient exhibit a steady gait? yes   How long did it take the patient to get up and walk from a sitting position? 1 sec   Is the patient self reliant?  (ie can do own laundry, meals, household chores)  yes     Does the patient handle his/her own medications? yes     Does the patient handle his/her own money? yes     Is the patients home safe (ie good lighting, handrails on stairs and bath, etc.)? yes     Did you notice or did patient express any hearing difficulties? no     Did you notice or did patient express any vision difficulties?   no     Were distance and reading eye charts used? no       Advance Care Planning:   Patient was offered the opportunity to discuss advance care planning:  yes     Does patient have an Advance Directive:  yes   If no, did you provide information on Caring Connections?          Plan:      Orders Placed This Encounter    CBC W/O DIFF    LIPID PANEL    METABOLIC PANEL, COMPREHENSIVE    TSH 3RD GENERATION    URINALYSIS W/ RFLX MICROSCOPIC    VITAMIN D, 25 HYDROXY    GLUCOSAM/CHOND-MSM1/C/YESI/BOR (GLUCOSAMINE-CHOND-MSM COMPLEX PO)    zinc 50 mg tab tablet       Health Maintenance   Topic Date Due    GLAUCOMA SCREENING Q2Y  12/13/2018    MEDICARE YEARLY EXAM  12/16/2018    COLONOSCOPY  04/01/2020    DTaP/Tdap/Td series (2 - Td) 12/09/2026    Hepatitis C Screening  Addressed    OSTEOPOROSIS SCREENING (DEXA)  Completed    ZOSTER VACCINE AGE 60>  Completed    Pneumococcal 65+ Low/Medium Risk  Completed    Influenza Age 5 to Adult  Completed       *Patient verbalized understanding and agreement with the plan. A copy of the After Visit Summary with personalized health plan was given to the patient today.

## 2017-12-15 NOTE — PROGRESS NOTES
HISTORY OF PRESENT ILLNESS   HPI  Fasting follow up hypertension, CKD II-III. I had referred her to Nephrology about a year ago and she is currently seeing Dr Tutu Ortiz q 6months. She had visits in May and November and was told that all was stable. She has h/o chronic mild hypertension which has improved w/ lifestyle modification alone. She follows a healthy diet and exercises everyday. Also her stress level has been down significantly since retiring 3 yrs ago. Feels well. No complaints at this time. REVIEW OF SYMPTOMS     Review of Systems   Constitutional: Negative. HENT: Negative. Eyes: Negative. Scheduled for next annual eye exam 1-8-2018   Respiratory: Negative. Cardiovascular: Negative. Gastrointestinal: Negative. Genitourinary: Negative. Musculoskeletal: Negative. Neurological: Negative.     Endo/Heme/Allergies: Negative.            PROBLEM LIST/MEDICAL HISTORY      Problem List  Date Reviewed: 12/15/2017          Codes Class Noted    Benign essential hypertension ICD-10-CM: I10  ICD-9-CM: 401.1  12/15/2017        CKD (chronic kidney disease) stage 3, GFR 30-59 ml/min ICD-10-CM: N18.3  ICD-9-CM: 585.3  1/2/2017    Overview Addendum 12/15/2017  9:32 AM by Berkley Palacios MD     Referred to nephrology 1-2017; Dr Bonny Kohler, related to chronic HTN             Acute onset of severe vertigo ICD-10-CM: R42  ICD-9-CM: 780.4  12/9/2016    Overview Signed 12/9/2016  9:55 AM by Berkley Palacios MD     6[facility]91-[facility]: labs, cardiac workup and brain MRI normal; dx w/ inner ear; resolved             Submammary Candidal Intertrigo ICD-10-CM: B37.2  ICD-9-CM: 112.3  12/9/2016    Overview Signed 12/9/2016  9:56 AM by Berkley Palacios MD     Dermatologist in [address] [place] [address], Dr Kg Martin             Migraine without aura ICD-10-CM: O57.443  ICD-9-CM: 346.10  12/9/2016    Overview Signed 12/9/2016  9:57 AM by Berkley Palacios MD     In her 30's: triggered by cheese, cow's milk, certain sweets/chocolate             Advance care planning ICD-10-CM: Z71.89  ICD-9-CM: V65.49  12/8/2015    Overview Addendum 12/15/2017  9:27 AM by Chula Neri MD     Initial ACP discussion. AMD form reviewed and copy provided. NN asst pt 12-8-15, copy in chart.  ACP note updated              Sleep related leg cramps ICD-10-CM: G47.62  ICD-9-CM: 327.52  12/29/2011    Overview Signed 12/29/2011  8:51 AM by Chula Neri MD     Improved w/ OTC Mg++             Atypical ductal hyperplasia of left breast, 2005 ICD-10-CM: N60.99  ICD-9-CM: 610.8  12/29/2011    Overview Addendum 12/29/2014  9:50 AM by Chula Neri MD     Followed routinely 2 x a year at [facility], Dr Timothy Shah             Lipoma of right breast, 2000 ICD-10-CM: D17.1  ICD-9-CM: 214.8  12/29/2011        Cataract, right eye ICD-10-CM: H26.9  ICD-9-CM: 366.9  12/29/2011    Overview Signed 12/29/2011  9:03 AM by Chula Neri MD     Early-2011             S/P colonoscopy 2009, 2014 ICD-10-CM: C03.641  ICD-9-CM: V45.89  11/9/2010    Overview Addendum 12/29/2014  9:52 AM by Chula Neri MD     [place]; F/U 3 yrs recommended d/t poor study/incomplete; had Barium Enema study instead which was normal; Colonoscopy 3/2014 Dr Camilo, polyp             Retinal hemorrhage, right eye ICD-10-CM: H35.61  ICD-9-CM: 362.81  8/16/2010    Overview Addendum 12/28/2012  9:23 AM by Chula Neri MD     ~2009; [facility], Dr Vince Nagy             Fibrocystic breasts ICD-10-CM: N60.19  ICD-9-CM: 610.1  8/16/2010        Postmenopause s/p hysterectomy s/p HRT 6466-0371 ICD-10-CM: Z78.0  ICD-9-CM: V49.81  8/16/2010        H/O Mild Chronic Renal Insufficiency ICD-10-CM: N28.9  ICD-9-CM: 593.9  8/16/2010    Overview Signed 8/16/2010  8:22 PM by Chula Neri MD     2009, Dr Olga Espinoza FHx: Breast Cancer ICD-10-CM: Z80.3  ICD-9-CM: V16.3 8/16/2010                  PAST SURGICAL HISTORY       Past Surgical History:   Procedure Laterality Date    COLONOSCOPY  ~2009    recommended f/u 5 yrs    HX BREAST BIOPSY  4/2005, Dr Jazmyne La    left breast, atypical ductal hyperplasia; Dr Jazmyne La, [address]    HX COLONOSCOPY  3/2014    Colon polyp, Dr Joao Davis, f/u 3 yrs    HX COLONOSCOPY  04/01/2017    \"normal\", repeat 10 yrs, Dr Paramjit King Left 11/14/2017    left lateral knee     HX CYNTHIA AND BSO  2002    AUB, Fibroids    TAYA MAMMOGRAM SCREENING BILATERAL  Routinely at [address]    q6months d/t strong FHX & h/o ADH; Dr Ken Delgado PAP, LIQUID BASED, MANUAL SCREEN  2/2010    normal, [place]    REPAIR OF HAMMERTOE,ONE  2008    right 5th toe         MEDICATIONS      Current Outpatient Prescriptions   Medication Sig    GLUCOSAM/CHOND-MSM1/C/YESI/BOR (GLUCOSAMINE-CHOND-MSM COMPLEX PO) Take 2 Tabs by mouth daily.  zinc 50 mg tab tablet Take  by mouth daily.  XIANG ROOT (XIANG, ZINGIBER OFFICINALIS,) 550 mg cap Take 550 mg by mouth two (2) times a day.  triamcinolone acetonide (KENALOG) 0.1 % topical cream Apply  to affected area daily. Per Derm Dr Rita Coburn for submammary intertrigo  Indications: at bedtime-under breast    nystatin (MYCOSTATIN) topical cream Apply  to affected area daily. Per Derm Dr Rita Coburn for submammary intertrigo  Indications: under Breast    Amino Acids-Whey Pro Con & Iso (WHEY PROTEIN) 25 gram-140 kcal/34 gram powd Take  by mouth.  magnesium 500 mg Tab Take  by mouth nightly.  ascorbic acid (VITAMIN C) 1,000 mg tablet Take 1,000 mg by mouth two (2) times a day.  FLAXSEED OIL PO Take 1 Cap by mouth two (2) times a day.  Cholecalciferol, Vitamin D3, (VITAMIN D) 5,000 unit Tab Take  by mouth daily. Since 2008    multivitamin (ONE A DAY) tablet Take 1 Tab by mouth daily.     omega-3 fatty acids-vitamin e (FISH OIL) 1,000 mg Cap Take  by mouth two (2) times a day.  CALCIUM CARBONATE/VITAMIN D3 (CALCIUM 500 + D, D3, PO) Take  by mouth two (2) times a day. No current facility-administered medications for this visit. ALLERGIES     Allergies   Allergen Reactions    Codeine Itching          SOCIAL HISTORY       Social History     Social History    Marital status:      Spouse name: N/A    Number of children: 3    Years of education: N/A     Occupational History    Retired from her job May 2014: formerly at [employer]      Social History Main Topics    Smoking status: Never Smoker    Smokeless tobacco: Never Used    Alcohol use No      Comment: none    Drug use: No    Sexual activity: No      Comment: not since her divorce in 1996     Other Topics Concern     Service No    Blood Transfusions No    Caffeine Concern No     2 cups of decaff herbal tea a day; no sodas or coffee    Occupational Exposure No    Hobby Hazards No    Sleep Concern No    Stress Concern No    Weight Concern No    Special Diet Yes     drinks lots of water; sensible, low fat, mostly baked, lots of veggies and blended juices, fresh fruits    Back Care No    Exercise Yes     walks 4-5 miles qd x 1 hr; wts 3 x a week    Bike Helmet No     does not ride   [place] Yes    Self-Exams Yes     Social History Narrative     since 55; Native of [place]; Prior PCP in [place], Dr Carlos Soto; Lives in [place], lives alone in her townhouse. Daughter nearby. Does her own ADL's. Drives. Manages her own medications and financial affairs.  Patient completed Advanced Directive , copy in chart.                         IMMUNIZATIONS  Immunization History   Administered Date(s) Administered    Hep B Vaccine 01/01/2013, 02/01/2013, 06/01/2013    Influenza High Dose Vaccine PF 12/29/2014, 12/08/2015, 12/09/2016, 10/12/2017    Influenza Vaccine 12/28/2012, 11/01/2013    Pneumococcal Conjugate (PCV-13) 2016    Pneumococcal Polysaccharide (PPSV-23) 2014    Zoster Vaccine, Live 2016         FAMILY HISTORY     Family History   Problem Relation Age of Onset    Diabetes Mother     Stroke Mother     Hypertension Mother     High Cholesterol Mother     Hypertension Father     Heart Disease Father     Seizures Maternal Grandmother     Diabetes Maternal Grandfather     Heart Disease Maternal Grandfather     Ovarian Cancer Sister     Breast Cancer Paternal Aunt       in her 42's    Breast Cancer Daughter      dx age 32; my pt; B Mastectomy         VITALS     Visit Vitals    /72 (BP 1 Location: Left arm, BP Patient Position: Sitting)    Pulse (!) 58    Temp 98.8 °F (37.1 °C) (Oral)    Resp 18    Ht 5' 10\" (1.778 m)    Wt 236 lb 3.2 oz (107.1 kg)    SpO2 96%    BMI 33.89 kg/m2          PHYSICAL EXAMINATION     Physical Exam   Constitutional: She is oriented to person, place, and time and well-developed, well-nourished, and in no distress. HENT:   Right Ear: Tympanic membrane normal.   Left Ear: Tympanic membrane normal.   Mouth/Throat: Oropharynx is clear and moist. No oropharyngeal exudate. Eyes: Conjunctivae are normal. Pupils are equal, round, and reactive to light. Neck: Neck supple. No JVD present. Carotid bruit is not present. No thyromegaly present. Cardiovascular: Normal rate, regular rhythm and normal heart sounds. No murmur heard. Pulmonary/Chest: Effort normal and breath sounds normal.   Abdominal: Soft. Bowel sounds are normal. She exhibits no distension and no mass. There is no tenderness. Musculoskeletal: Normal range of motion. She exhibits no edema or tenderness. Lymphadenopathy:     She has no cervical adenopathy. Neurological: She is alert and oriented to person, place, and time. Psychiatric: Mood and affect normal.   Vitals reviewed.              ASSESSMENT & PLAN       ICD-10-CM ICD-9-CM    1.  Routine general medical examination at a health care facility Z00.00 V70.0 Medicare AWV,    2. Benign essential hypertension I10 401.1 CBC W/O DIFF      LIPID PANEL      METABOLIC PANEL, COMPREHENSIVE      TSH 3RD GENERATION      URINALYSIS W/ RFLX MICROSCOPIC   3. CKD (chronic kidney disease) stage 3, GFR 30-59 ml/min Q77.5 747.5 METABOLIC PANEL, COMPREHENSIVE      URINALYSIS W/ RFLX MICROSCOPIC      VITAMIN D, 25 HYDROXY     Fasting labs today  Reviewed diet, exercise and weight control; Patient counseled about current BMI, goals, diet, nutrition, exercise, weight management. Nutrition/meal planning discussed. Monitor weights. Appropriate patient education materials included with or without corresponding AVS via handout or [employer] if activated. Reassess at next follow up visit. Cardiovascular risk and specific lipid/LDL/BP goals reviewed  She has h/o chronic mild hypertension which has improved w/ lifestyle modification alone. Commended on her healthy lifestyle practices. I had referred her to Nephrology about a year ago and she is currently seeing Dr Abdifatah Manzo q 6months. She had visits in May and November and was told that all was stable. Further follow up & other recommendations pending review of labs. If all remains good and stable, RTC 1 yr. Return sooner prn  Had mammogram screening at [facility] w/ Dr Morton Headings 11-3-17.  Colonoscopy 4-2017 and told to repeat in 10 yrs.

## 2017-12-15 NOTE — PROGRESS NOTES
Chief Complaint   Patient presents with   [facility]     -PODGAFX      1. Have you been to the ER, urgent care clinic since your last visit? Hospitalized since your last visit? Yes [facility] ER for neck in 8/2017    2. Have you seen or consulted any other health care providers outside of the [address] [place] [address] since your last visit? Include any pap smears or colon screening.    Yes Dr Alicia Mariee

## 2017-12-15 NOTE — MR AVS SNAPSHOT
Visit Information Date & Time Provider Department Dept. Phone Encounter #  
 12/15/2017  9:00 AM [address], [address] [facility] [phone] [id number] Upcoming Health Maintenance Date Due  
 GLAUCOMA SCREENING Q2Y 12/13/2018 MEDICARE YEARLY EXAM 12/16/2018 COLONOSCOPY 4/1/2020 DTaP/Tdap/Td series (2 - Td) 12/9/2026 Allergies as of 12/15/2017  Review Complete On: 12/15/2017 By: [address], [place]  
  
 Severity Noted Reaction Type Reactions Codeine  08/16/2010    Itching Current Immunizations  Reviewed on 12/15/2017 Name Date Hep B Vaccine 6/1/2013, 2/1/2013, 1/1/2013 Influenza High Dose Vaccine PF 10/12/2017, 12/9/2016, 12/8/2015, 12/29/2014 Influenza Vaccine 11/1/2013, 12/28/2012 Pneumococcal Conjugate (PCV-13) 12/21/2016 Pneumococcal Polysaccharide (PPSV-23) 12/29/2014 Zoster Vaccine, Live 1/5/2016 Reviewed by [address], [place] on 12/15/2017 at  9:29 AM  
You Were Diagnosed With   
  
 Codes Comments Routine general medical examination at a health care facility    -  Primary ICD-10-CM: Z00.00 ICD-9-CM: V70.0 CKD (chronic kidney disease) stage 3, GFR 30-59 ml/min     ICD-10-CM: N18.3 ICD-9-CM: 556. 3 Vitals BP Pulse Temp Resp Height(growth percentile) Weight(growth percentile) 116/72 (BP 1 Location: Left arm, BP Patient Position: Sitting) (!) 58 98.8 °F (37.1 °C) (Oral) 18 5' 10\" (1.778 m) 236 lb 3.2 oz (107.1 kg) SpO2 BMI OB Status Smoking Status 96% 33.89 kg/m2 Hysterectomy Never Smoker BMI and BSA Data Body Mass Index Body Surface Area  
 33.89 kg/m 2 2.3 m 2 Preferred Pharmacy Pharmacy Name Phone [facility] [address] Your Updated Medication List  
  
   
This list is accurate as of: 12/15/17  9:39 AM.  Always use your most recent med list.  
  
  
  
  
 CALCIUM 500 + D (D3) PO Take  by mouth two (2) times a day. FISH OIL 1,000 mg Cap Generic drug:  omega-3 fatty acids-vitamin e Take  by mouth two (2) times a day. FLAXSEED OIL PO Take 1 Cap by mouth two (2) times a day. patricia (Zingiber officinalis) 550 mg Cap Take 550 mg by mouth two (2) times a day. GLUCOSAMINE-CHOND-MSM COMPLEX PO Take 2 Tabs by mouth daily. magnesium 500 mg Tab Take  by mouth nightly. multivitamin tablet Commonly known as:  ONE A DAY Take 1 Tab by mouth daily. nystatin topical cream  
Commonly known as:  MYCOSTATIN Apply  to affected area daily. Per Derm Dr Neal Wilcox for submammary intertrigo  Indications: under Breast  
  
 triamcinolone acetonide 0.1 % topical cream  
Commonly known as:  KENALOG Apply  to affected area daily. Per Derm Dr Neal Wilcox for submammary intertrigo  Indications: at bedtime-under breast  
  
 VITAMIN C 1,000 mg tablet Generic drug:  ascorbic acid (vitamin C) Take 1,000 mg by mouth two (2) times a day. VITAMIN D 5,000 unit Tab tablet Generic drug:  cholecalciferol (VITAMIN D3) Take  by mouth daily. Since 2008 WHEY PROTEIN 25 gram-140 kcal/34 gram Powd Generic drug:  Amino Acids-Whey Pro Con & Iso Take  by mouth. zinc 50 mg Tab tablet Take  by mouth daily. Patient Instructions Well Visit, Over 72: Care Instructions Your Care Instructions Physical exams can help you stay healthy. Your doctor has checked your overall health and may have suggested ways to take good care of yourself. He or she also may have recommended tests. At home, you can help prevent illness with healthy eating, regular exercise, and other steps. Follow-up care is a key part of your treatment and safety. Be sure to make and go to all appointments, and call your doctor if you are having problems. It's also a good idea to know your test results and keep a list of the medicines you take. How can you care for yourself at home? · Reach and stay at a healthy weight. This will lower your risk for many problems, such as obesity, diabetes, heart disease, and high blood pressure. · Get at least 30 minutes of exercise on most days of the week. Walking is a good choice. You also may want to do other activities, such as running, swimming, cycling, or playing tennis or team sports. · Do not smoke. Smoking can make health problems worse. If you need help quitting, talk to your doctor about stop-smoking programs and medicines. These can increase your chances of quitting for good. · Protect your skin from too much sun. When you're outdoors from 10 a.m. to 4 p.m., stay in the shade or cover up with clothing and a hat with a wide brim. Wear sunglasses that block UV rays. Even when it's cloudy, put broad-spectrum sunscreen (SPF 30 or higher) on any exposed skin. · See a dentist one or two times a year for checkups and to have your teeth cleaned. · Wear a seat belt in the car. · Limit alcohol to 2 drinks a day for men and 1 drink a day for women. Too much alcohol can cause health problems. Follow your doctor's advice about when to have certain tests. These tests can spot problems early. For men and women · Cholesterol. Your doctor will tell you how often to have this done based on your overall health and other things that can increase your risk for heart attack and stroke. · Blood pressure. Have your blood pressure checked during a routine doctor visit. Your doctor will tell you how often to check your blood pressure based on your age, your blood pressure results, and other factors. · Diabetes. Ask your doctor whether you should have tests for diabetes. · Vision. Experts recommend that you have yearly exams for glaucoma and other age-related eye problems. · Hearing. Tell your doctor if you notice any change in your hearing. You can have tests to find out how well you hear. · Colon cancer tests. Keep having colon cancer tests as your doctor recommends. You can have one of several types of tests. · Heart attack and stroke risk. At least every 4 to 6 years, you should have your risk for heart attack and stroke assessed. Your doctor uses factors such as your age, blood pressure, cholesterol, and whether you smoke or have diabetes to show what your risk for a heart attack or stroke is over the next 10 years. · Osteoporosis. Talk to your doctor about whether you should have a bone density test to find out whether you have thinning bones. Also ask your doctor about whether you should take calcium and vitamin D supplements. For women · Pap test and pelvic exam. You may no longer need a Pap test. Talk with your doctor about whether to stop or continue to have Pap tests. · Breast exam and mammogram. Ask how often you should have a mammogram, which is an X-ray of your breasts. A mammogram can spot breast cancer before it can be felt and when it is easiest to treat. · Thyroid disease. Talk to your doctor about whether to have your thyroid checked as part of a regular physical exam. Women have an increased chance of a thyroid problem. For men · Prostate exam. Talk to your doctor about whether you should have a blood test (called a PSA test) for prostate cancer. Experts disagree on whether men should have this test. Some experts recommend that you discuss the benefits and risks of the test with your doctor. · Abdominal aortic aneurysm. Ask your doctor whether you should have a test to check for an aneurysm. You may need a test if you ever smoked or if your parent, brother, sister, or child has had an aneurysm. When should you call for help? Watch closely for changes in your health, and be sure to contact your doctor if you have any problems or symptoms that concern you. Where can you learn more? Go to http://dong-libby.info/. Enter C001 in the search box to learn more about \"Well Visit, Over 65: Care Instructions. \" Current as of: May 12, 2017 Content Version: 11.4 © 9005-8978 Healthwise, Incorporated. Care instructions adapted under license by [employer] (which disclaims liability or warranty for this information). If you have questions about a medical condition or this instruction, always ask your healthcare professional. [address] any warranty or liability for your use of this information. Introducing [facility]! [employer] introduces [employer] patient portal. Now you can access parts of your medical record, email your doctor's office, and request medication refills online. 1. In your internet browser, go to [email or web address] 2. Click on the First Time User? Click Here link in the Sign In box. You will see the New Member Sign Up page. 3. Enter your [employer] Access Code exactly as it appears below. You will not need to use this code after youve completed the sign-up process. If you do not sign up before the expiration date, you must request a new code. · [employer] Access Code: [email or web address] Expires: 1/10/2018  9:01 AM 
 
4. Enter the last four digits of your Social Security Number (xxxx) and Date of Birth (mm/dd/yyyy) as indicated and click Submit. You will be taken to the next sign-up page. 5. Create a [employer] ID. This will be your [employer] login ID and cannot be changed, so think of one that is secure and easy to remember. 6. Create a [employer] password. You can change your password at any time. 7. Enter your Password Reset Question and Answer. This can be used at a later time if you forget your password. 8. Enter your e-mail address. You will receive e-mail notification when new information is available in [address] 9. Click Sign Up. You can now view and download portions of your medical record. 10. Click the Download Summary menu link to download a portable copy of your medical information. If you have questions, please visit the Frequently Asked Questions section of the [employer] website. Remember, [employer] is NOT to be used for urgent needs. For medical emergencies, dial 911. Now available from your iPhone and Android! Please provide this summary of care documentation to your next provider. Your primary care clinician is listed as RUSLAN CHOPRA. If you have any questions after today's visit, please call [phone].

## 2017-12-15 NOTE — PATIENT INSTRUCTIONS
Well Visit, Over 72: Care Instructions  Your Care Instructions    Physical exams can help you stay healthy. Your doctor has checked your overall health and may have suggested ways to take good care of yourself. He or she also may have recommended tests. At home, you can help prevent illness with healthy eating, regular exercise, and other steps. Follow-up care is a key part of your treatment and safety. Be sure to make and go to all appointments, and call your doctor if you are having problems. It's also a good idea to know your test results and keep a list of the medicines you take. How can you care for yourself at home? · Reach and stay at a healthy weight. This will lower your risk for many problems, such as obesity, diabetes, heart disease, and high blood pressure. · Get at least 30 minutes of exercise on most days of the week. Walking is a good choice. You also may want to do other activities, such as running, swimming, cycling, or playing tennis or team sports. · Do not smoke. Smoking can make health problems worse. If you need help quitting, talk to your doctor about stop-smoking programs and medicines. These can increase your chances of quitting for good. · Protect your skin from too much sun. When you're outdoors from 10 a.m. to 4 p.m., stay in the shade or cover up with clothing and a hat with a wide brim. Wear sunglasses that block UV rays. Even when it's cloudy, put broad-spectrum sunscreen (SPF 30 or higher) on any exposed skin. · See a dentist one or two times a year for checkups and to have your teeth cleaned. · Wear a seat belt in the car. · Limit alcohol to 2 drinks a day for men and 1 drink a day for women. Too much alcohol can cause health problems. Follow your doctor's advice about when to have certain tests. These tests can spot problems early. For men and women  · Cholesterol.  Your doctor will tell you how often to have this done based on your overall health and other things that can increase your risk for heart attack and stroke. · Blood pressure. Have your blood pressure checked during a routine doctor visit. Your doctor will tell you how often to check your blood pressure based on your age, your blood pressure results, and other factors. · Diabetes. Ask your doctor whether you should have tests for diabetes. · Vision. Experts recommend that you have yearly exams for glaucoma and other age-related eye problems. · Hearing. Tell your doctor if you notice any change in your hearing. You can have tests to find out how well you hear. · Colon cancer tests. Keep having colon cancer tests as your doctor recommends. You can have one of several types of tests. · Heart attack and stroke risk. At least every 4 to 6 years, you should have your risk for heart attack and stroke assessed. Your doctor uses factors such as your age, blood pressure, cholesterol, and whether you smoke or have diabetes to show what your risk for a heart attack or stroke is over the next 10 years. · Osteoporosis. Talk to your doctor about whether you should have a bone density test to find out whether you have thinning bones. Also ask your doctor about whether you should take calcium and vitamin D supplements. For women  · Pap test and pelvic exam. You may no longer need a Pap test. Talk with your doctor about whether to stop or continue to have Pap tests. · Breast exam and mammogram. Ask how often you should have a mammogram, which is an X-ray of your breasts. A mammogram can spot breast cancer before it can be felt and when it is easiest to treat. · Thyroid disease. Talk to your doctor about whether to have your thyroid checked as part of a regular physical exam. Women have an increased chance of a thyroid problem. For men  · Prostate exam. Talk to your doctor about whether you should have a blood test (called a PSA test) for prostate cancer.  Experts disagree on whether men should have this test. Some experts recommend that you discuss the benefits and risks of the test with your doctor. · Abdominal aortic aneurysm. Ask your doctor whether you should have a test to check for an aneurysm. You may need a test if you ever smoked or if your parent, brother, sister, or child has had an aneurysm. When should you call for help? Watch closely for changes in your health, and be sure to contact your doctor if you have any problems or symptoms that concern you. Where can you learn more? Go to http://dong-libby.info/. Enter R698 in the search box to learn more about \"Well Visit, Over 65: Care Instructions. \"  Current as of: May 12, 2017  Content Version: 11.4  © 9530-4856 [employer]. Care instructions adapted under license by [employer] (which disclaims liability or warranty for this information). If you have questions about a medical condition or this instruction, always ask your healthcare professional. [place] [address] any warranty or liability for your use of this information. Schedule of Personalized Health Plan  (Provide Copy to Patient)  The best way to stay healthy is to live a healthy lifestyle. A healthy lifestyle includes regular exercise, eating a well-balanced diet, keeping a healthy weight and not smoking. Regular physical exams and screening tests are another important way to take care of yourself. Preventive exams provided by health care providers can find health problems early when treatment works best and can keep you from getting certain diseases or illnesses. Preventive services include exams, lab tests, screenings, shots, monitoring and information to help you take care of your own health. All people over 65 should have a pneumonia shot. Pneumonia shots are usually only needed once in a lifetime unless your doctor decides differently. All people over 65 should have a yearly flu shot.     People over 65 are at medium to high risk for Hepatitis B. Three shots are needed for complete protection. In addition to your physical exam, some screening tests are recommended:    Bone mass measurement (dexa scan) is recommended every two years  Diabetes Mellitus screening is recommended every year. Glaucoma is an eye disease caused by high pressure in the eye. An eye exam is recommended every year. Cardiovascular screening tests that check your cholesterol and other blood fat (lipid) levels are recommended every five years. Colorectal Cancer screening tests help to find pre-cancerous polyps (growths in the colon) so they can be removed before they turn into cancer. Tests ordered for screening depend on your personal and family history risk factors.     Screening for Breast Cancer is recommended yearly with a mammogram.    Screening for Cervical Cancer is recommended every two years (annually for certain risk factors, such as previous history of STD or abnormal PAP in past 7 years), with a Pelvic Exam with PAP    Here is a list of your current Health Maintenance items with a due date:  Health Maintenance   Topic Date Due    GLAUCOMA SCREENING Q2Y  12/13/2018    [address] [place] [address]  12/16/2018    COLONOSCOPY  04/01/2020    DTaP/Tdap/Td series (2 - Td) 12/09/2026    Hepatitis C Screening  Addressed    OSTEOPOROSIS SCREENING (DEXA)  Completed    ZOSTER VACCINE AGE 60>  Completed    Pneumococcal 65+ Low/Medium Risk  Completed    Influenza Age 5 to Adult  Completed

## 2017-12-16 LAB
25(OH)D3+25(OH)D2 SERPL-MCNC: 62.5 NG/ML (ref 30–100)
ALBUMIN SERPL-MCNC: 4.4 G/DL (ref 3.6–4.8)
ALBUMIN/GLOB SERPL: 1.3 {RATIO} (ref 1.2–2.2)
ALP SERPL-CCNC: 86 IU/L (ref 39–117)
ALT SERPL-CCNC: 24 IU/L (ref 0–32)
APPEARANCE UR: CLEAR
AST SERPL-CCNC: 18 IU/L (ref 0–40)
BILIRUB SERPL-MCNC: 0.6 MG/DL (ref 0–1.2)
BILIRUB UR QL STRIP: NEGATIVE
BUN SERPL-MCNC: 18 MG/DL (ref 8–27)
BUN/CREAT SERPL: 15 (ref 12–28)
CALCIUM SERPL-MCNC: 9.6 MG/DL (ref 8.7–10.3)
CHLORIDE SERPL-SCNC: 101 MMOL/L (ref 96–106)
CHOLEST SERPL-MCNC: 156 MG/DL (ref 100–199)
CO2 SERPL-SCNC: 23 MMOL/L (ref 18–29)
COLOR UR: YELLOW
CREAT SERPL-MCNC: 1.21 MG/DL (ref 0.57–1)
ERYTHROCYTE [DISTWIDTH] IN BLOOD BY AUTOMATED COUNT: 14 % (ref 12.3–15.4)
GFR SERPLBLD CREATININE-BSD FMLA CKD-EPI: 46 ML/MIN/1.73
GFR SERPLBLD CREATININE-BSD FMLA CKD-EPI: 53 ML/MIN/1.73
GLOBULIN SER CALC-MCNC: 3.4 G/DL (ref 1.5–4.5)
GLUCOSE SERPL-MCNC: 82 MG/DL (ref 65–99)
GLUCOSE UR QL: NEGATIVE
HCT VFR BLD AUTO: 42.6 % (ref 34–46.6)
HDLC SERPL-MCNC: 43 MG/DL
HGB BLD-MCNC: 14.1 G/DL (ref 11.1–15.9)
HGB UR QL STRIP: NEGATIVE
INTERPRETATION, 910389: NORMAL
INTERPRETATION: NORMAL
INTERPRETATION: NORMAL
KETONES UR QL STRIP: NEGATIVE
LDLC SERPL CALC-MCNC: 91 MG/DL (ref 0–99)
LEUKOCYTE ESTERASE UR QL STRIP: NEGATIVE
Lab: NORMAL
MCH RBC QN AUTO: 26.6 PG (ref 26.6–33)
MCHC RBC AUTO-ENTMCNC: 33.1 G/DL (ref 31.5–35.7)
MCV RBC AUTO: 80 FL (ref 79–97)
MICRO URNS: ABNORMAL
NITRITE UR QL STRIP: NEGATIVE
PDF IMAGE, 910387: NORMAL
PH UR STRIP: 7 [PH] (ref 5–7.5)
PLATELET # BLD AUTO: 265 X10E3/UL (ref 150–379)
POTASSIUM SERPL-SCNC: 4.9 MMOL/L (ref 3.5–5.2)
PROT SERPL-MCNC: 7.8 G/DL (ref 6–8.5)
PROT UR QL STRIP: NEGATIVE
RBC # BLD AUTO: 5.31 X10E6/UL (ref 3.77–5.28)
SODIUM SERPL-SCNC: 143 MMOL/L (ref 134–144)
SP GR UR: <=1.005 (ref 1–1.03)
TRIGL SERPL-MCNC: 111 MG/DL (ref 0–149)
TSH SERPL DL<=0.005 MIU/L-ACNC: 2.69 UIU/ML (ref 0.45–4.5)
UROBILINOGEN UR STRIP-MCNC: 0.2 MG/DL (ref 0.2–1)
VLDLC SERPL CALC-MCNC: 22 MG/DL (ref 5–40)
WBC # BLD AUTO: 5 X10E3/UL (ref 3.4–10.8)

## 2018-01-06 ENCOUNTER — TELEPHONE (OUTPATIENT)
Dept: FAMILY MEDICINE CLINIC | Age: 68
End: 2018-01-06

## 2018-01-06 NOTE — TELEPHONE ENCOUNTER
Urine, blood counts, liver, thyroid, vitamin D, bs and electrolytes all normal.  Kidney fnc is borderline but stable from last check (followed by renal Dr Chiqui Pittman)  Lipids overall good and wnl but HDL at 43 and goal is >50  Cont sensible diet that is low in cholesterol and high in fiber and green vegetables, lean protein and cont regular exercise at least 150 minutes per week  Fasting CPE 1 yr.  RTC sooner prn

## 2018-01-08 NOTE — TELEPHONE ENCOUNTER
Spoke with patient regarding results and recommendations. Voiced understanding.    Sent patient copy of labs at her request.

## 2018-12-21 ENCOUNTER — OFFICE VISIT (OUTPATIENT)
Dept: FAMILY MEDICINE CLINIC | Age: 68
End: 2018-12-21

## 2018-12-21 ENCOUNTER — HOSPITAL ENCOUNTER (OUTPATIENT)
Dept: LAB | Age: 68
Discharge: HOME | End: 2018-12-21
Payer: MEDICARE

## 2018-12-21 VITALS
HEART RATE: 63 BPM | DIASTOLIC BLOOD PRESSURE: 76 MMHG | HEIGHT: 67 IN | TEMPERATURE: 98.3 F | BODY MASS INDEX: 34.94 KG/M2 | WEIGHT: 222.6 LBS | OXYGEN SATURATION: 96 % | SYSTOLIC BLOOD PRESSURE: 138 MMHG | RESPIRATION RATE: 18 BRPM

## 2018-12-21 DIAGNOSIS — Z23 ENCOUNTER FOR IMMUNIZATION: ICD-10-CM

## 2018-12-21 DIAGNOSIS — N18.30 CKD (CHRONIC KIDNEY DISEASE) STAGE 3, GFR 30-59 ML/MIN (HCC): ICD-10-CM

## 2018-12-21 DIAGNOSIS — Z00.00 MEDICARE ANNUAL WELLNESS VISIT, SUBSEQUENT: Primary | ICD-10-CM

## 2018-12-21 DIAGNOSIS — I10 BENIGN ESSENTIAL HYPERTENSION: ICD-10-CM

## 2018-12-21 DIAGNOSIS — Z11.59 NEED FOR HEPATITIS C SCREENING TEST: ICD-10-CM

## 2018-12-21 PROCEDURE — 80053 COMPREHEN METABOLIC PANEL: CPT

## 2018-12-21 PROCEDURE — 85027 COMPLETE CBC AUTOMATED: CPT

## 2018-12-21 PROCEDURE — 81003 URINALYSIS AUTO W/O SCOPE: CPT

## 2018-12-21 PROCEDURE — 80061 LIPID PANEL: CPT

## 2018-12-21 PROCEDURE — 86803 HEPATITIS C AB TEST: CPT

## 2018-12-21 PROCEDURE — 84443 ASSAY THYROID STIM HORMONE: CPT

## 2018-12-21 NOTE — PROGRESS NOTES
This is the Subsequent Medicare Annual Wellness Exam, performed 12 months or more after the Initial AWV or the last Subsequent AWV    I have reviewed the patient's medical history in detail and updated the computerized patient record. History     Past Medical History:   Diagnosis Date    Acute onset of severe vertigo 12/9/2016 9-21-16 [facility]: labs, cardiac workup and brain MRI normal; dx w/ inner ear; resolved    Advance care planning 12/8/2015    Advance care planning 12/8/2015    Initial ACP discussion. AMD form reviewed and copy provided.  NN to d/w pt 12-8-15    Atypical ductal hyperplasia of left breast, 2005 12/29/2011    Benign essential hypertension 12/15/2017    Cataract, right eye 12/29/2011    CKD (chronic kidney disease) stage 3, GFR 30-59 ml/min ([facility]) 1/2/2017    Referred to nephrology 1-2017    Fibrocystic breasts 8/16/2010    H/O Chronic Renal Insufficiency 8/16/2010    Lipoma of right breast, 2000 12/29/2011    Migraine without aura 12/9/2016    In her 30's: triggered by cheese, cow's milk, certain sweets/chocolate    Postmenopause s/p hysterectomy s/p HRT [id number] 8/16/2010    Retinal hemorrhage, right eye 8/16/2010    S/P colonoscopy 2009, 2014 11/9/2010    [place]; F/U 3 yrs recommended d/t poor study/incomplete; had Barium Enema study instead which was normal; Colonoscopy 3/2014 Dr Nicky Us, polyp    Sleep related leg cramps 12/29/2011    Strong FHx: Breast Cancer 8/16/2010    Submammary Candidal Intertrigo 12/9/2016    Dermatologist in [address] [place] [address], Dr Xander Dillon    Vertigo 09/2016      Past Surgical History:   Procedure Laterality Date    COLONOSCOPY  ~2009    recommended f/u 5 yrs    HX BREAST BIOPSY  4/2005, Dr Blanca Mas    left breast, atypical ductal hyperplasia; Dr Blanca Mas, [address] [place] [address]    HX BREAST LUMPECTOMY      HX COLONOSCOPY  3/2014    Colon polyp, Dr Nicky Us, f/u 3 yrs    HX COLONOSCOPY  04/01/2017    \"normal\", repeat 10 yrs, Dr Niels Crow Left 11/14/2017    left lateral knee     HX CYNTHIA AND BSO  2002    AUB, Fibroids    TAYA MAMMOGRAM SCREENING BILATERAL  Routinely at [address]    q6months d/t strong FHX & h/o ADH; Dr Kayode Ballard PAP, LIQUID BASED, MANUAL SCREEN  2/2010    normal, [place]    REPAIR OF BHARAT,ONE  2008    right 5th toe     Current Outpatient Medications   Medication Sig Dispense Refill    GLUCOSAM/CHOND-MSM1/C/YESI/BOR (GLUCOSAMINE-CHOND-MSM COMPLEX PO) Take 2 Tabs by mouth daily.  zinc 50 mg tab tablet Take  by mouth daily.  XIANG ROOT (XIANG, ZINGIBER OFFICINALIS,) 550 mg cap Take 550 mg by mouth two (2) times a day.  triamcinolone acetonide (KENALOG) 0.1 % topical cream Apply  to affected area daily. Per Derm Dr Orin Drake for submammary intertrigo  Indications: at bedtime-under breast      nystatin (MYCOSTATIN) topical cream Apply  to affected area daily. Per Derm Dr Orin Drake for submammary intertrigo  Indications: under Breast      Amino Acids-Whey Pro Con & Iso (WHEY PROTEIN) 25 gram-140 kcal/34 gram powd Take  by mouth.  magnesium 500 mg Tab Take  by mouth nightly.  ascorbic acid (VITAMIN C) 1,000 mg tablet Take 1,000 mg by mouth two (2) times a day.  FLAXSEED OIL PO Take 1 Cap by mouth two (2) times a day.  Cholecalciferol, Vitamin D3, (VITAMIN D) 5,000 unit Tab Take  by mouth daily. Since 2008      multivitamin (ONE A DAY) tablet Take 1 Tab by mouth daily.  omega-3 fatty acids-vitamin e (FISH OIL) 1,000 mg Cap Take  by mouth two (2) times a day.  CALCIUM CARBONATE/VITAMIN D3 (CALCIUM 500 + D, D3, PO) Take  by mouth two (2) times a day.        Allergies   Allergen Reactions    Codeine Itching     Family History   Problem Relation Age of Onset    Diabetes Mother     Stroke Mother     Hypertension Mother     High Cholesterol Mother     Hypertension Father     Heart Disease Father     Seizures Maternal Grandmother     Diabetes Maternal Grandfather     Heart Disease Maternal Grandfather     Ovarian Cancer Sister     Breast Cancer Paternal Aunt          in her 39's    Breast Cancer Daughter         dx age 31; my pt; B Mastectomy     Social History     Tobacco Use    Smoking status: Never Smoker    Smokeless tobacco: Never Used   Substance Use Topics    Alcohol use: No     Comment: none     Patient Active Problem List   Diagnosis Code    Retinal hemorrhage, right eye H35.61    Fibrocystic breasts N60.19    Postmenopause s/p hysterectomy s/p HRT 5621-7439 Z78.0    H/O Mild Chronic Renal Insufficiency N28.9    Strong FHx: Breast Cancer Z80.3    S/P colonoscopy ,  Z98.890    Sleep related leg cramps G47.62    Atypical ductal hyperplasia of left breast,  N60.99    Lipoma of right breast,  D17.1    Cataract, right eye H26.9    Advance care planning Z71.89    Acute onset of severe vertigo R42    Submammary Candidal Intertrigo B37.2    Migraine without aura G43.009    CKD (chronic kidney disease) stage 3, GFR 30-59 ml/min ([facility]) N18.3    Benign essential hypertension I10       Depression Risk Factor Screening:     PHQ over the last two weeks 2018   Little interest or pleasure in doing things Not at all   Feeling down, depressed, irritable, or hopeless Not at all   Total Score PHQ 2 0     Alcohol Risk Factor Screening: You do not drink alcohol or very rarely. Functional Ability and Level of Safety:   Hearing Loss  Hearing is good. Activities of Daily Living  The home contains: no safety equipment. Patient does total self care    Fall Risk  Fall Risk Assessment, last 12 mths 2018   Able to walk? Yes   Fall in past 12 months?  No       Abuse Screen  Patient is not abused    Cognitive Screening   Evaluation of Cognitive Function:  Has your family/caregiver stated any concerns about your memory: no      Patient Care Team   Patient Care Team:  Teri Tiwari Slava Mercado MD as PCP - General    Assessment/Plan   Education and counseling provided:  Are appropriate based on today's review and evaluation    Diagnoses and all orders for this visit:    1. Medicare annual wellness visit, subsequent    2. Encounter for immunization  -     INFLUENZA VACCINE INACTIVATED (IIV), SUBUNIT, ADJUVANTED, IM  -     ADMIN INFLUENZA VIRUS VAC    3.  Need for hepatitis C screening test  -     HEPATITIS C AB        Health Maintenance Due   Topic Date Due    Shingrix Vaccine Age 49> (1 of 2) 11/20/1999    GLAUCOMA SCREENING Q2Y  12/13/2018

## 2018-12-21 NOTE — PROGRESS NOTES
HISTORY OF PRESENT ILLNESS   HPI  Fasting follow up hypertension, CKD, labs. Overall has been getting along well and feeling well in general.  Continues to eat a very healthy diet and exercises regularly. Happy she has gotten her wt down more since eating better. Feels great, no complaints. Her BP is well controlled w/ lifestyle modification only. REVIEW OF SYMPTOMS     Review of Systems   Constitutional: Negative. Respiratory: Negative. Cardiovascular: Negative. Gastrointestinal: Negative. Neurological: Negative. Psychiatric/Behavioral: Negative.            PROBLEM LIST/MEDICAL HISTORY      Problem List  Date Reviewed: 12/21/2018          Codes Class Noted    Benign essential hypertension ICD-10-CM: I10  ICD-9-CM: 401.1  12/15/2017        CKD (chronic kidney disease) stage 3, GFR 30-59 ml/min ([facility]) ICD-10-CM: N18.3  ICD-9-CM: 585.3  1/2/2017    Overview Addendum 12/15/2017  9:32 AM by Dotty Knowles MD     Referred to nephrology 1-2017; Dr Pierce Sanchez, related to chronic HTN             Acute onset of severe vertigo ICD-10-CM: R42  ICD-9-CM: 780.4  12/9/2016    Overview Signed 12/9/2016  9:55 AM by Dotty Knowles MD     9-21-16 [facility]: labs, cardiac workup and brain MRI normal; dx w/ inner ear; resolved             Submammary Candidal Intertrigo ICD-10-CM: B37.2  ICD-9-CM: 112.3  12/9/2016    Overview Signed 12/9/2016  9:56 AM by Dotty Knowles MD     Dermatologist in [address] [place] [address], Dr Lia Harp             Migraine without aura ICD-10-CM: O29.448  ICD-9-CM: 346.10  12/9/2016    Overview Signed 12/9/2016  9:57 AM by Dotty Knowles MD     In her 30's: triggered by cheese, cow's milk, certain sweets/chocolate             Advance care planning ICD-10-CM: Z71.89  ICD-9-CM: V65.49  12/8/2015    Overview Addendum 12/15/2017  9:27 AM by Dotty Knowles MD     Initial ACP discussion. AMD form reviewed and copy provided.  NN asst pt 12-8-15, copy in chart.  ACP note updated              Sleep related leg cramps ICD-10-CM: G47.62  ICD-9-CM: 327.52  12/29/2011    Overview Signed 12/29/2011  8:51 AM by Christy Herbert MD     Improved w/ OTC Mg++             Atypical ductal hyperplasia of left breast, 2005 ICD-10-CM: N60.99  ICD-9-CM: 610.8  12/29/2011    Overview Addendum 12/29/2014  9:50 AM by Christy Herbert MD     Followed routinely 2 x a year at [facility], Dr Shvi Garnett             Lipoma of right breast, 2000 ICD-10-CM: D17.1  ICD-9-CM: 214.8  12/29/2011        Cataract, right eye ICD-10-CM: H26.9  ICD-9-CM: 366.9  12/29/2011    Overview Signed 12/29/2011  9:03 AM by Christy Herbert MD     Early-2011             S/P colonoscopy 2009, 2014 ICD-10-CM: B19.338  ICD-9-CM: V45.89  11/9/2010    Overview Addendum 12/29/2014  9:52 AM by Christy Herbert MD     [place]; F/U 3 yrs recommended d/t poor study/incomplete; had Barium Enema study instead which was normal; Colonoscopy 3/2014 Dr Gustavo Elias, polyp             Retinal hemorrhage, right eye ICD-10-CM: H35.61  ICD-9-CM: 362.81  8/16/2010    Overview Addendum 12/28/2012  9:23 AM by Christy Herbert MD     ~2009; [place], Dr Milana Hatch             Fibrocystic breasts ICD-10-CM: N60.19  ICD-9-CM: 610.1  8/16/2010        Postmenopause s/p hysterectomy s/p HRT [id number] ICD-10-CM: Z78.0  ICD-9-CM: V49.81  8/16/2010        H/O Mild Chronic Renal Insufficiency ICD-10-CM: N28.9  ICD-9-CM: 593.9  8/16/2010    Overview Signed 8/16/2010  8:22 PM by Christy Herbert MD     2009, Dr Vern Chowdary FHx: Breast Cancer ICD-10-CM: Z80.3  ICD-9-CM: V16.3  8/16/2010                  PAST SURGICAL HISTORY       Past Surgical History:   Procedure Laterality Date    COLONOSCOPY  ~2009    recommended f/u 5 yrs    HX BREAST BIOPSY  4/2005, Dr Shiv Garnett    left breast, atypical ductal hyperplasia; Dr Shiv Garnett, [facility] BREAST LUMPECTOMY      HX COLONOSCOPY  3/2014    Colon polyp, Dr Leigh Ann Cruz, f/u 3 yrs    HX COLONOSCOPY  04/01/2017    \"normal\", repeat 10 yrs, Dr Micky Dee Left 11/14/2017    left lateral knee     HX CYNTHIA AND BSO  2002    AUB, Fibroids    TAYA MAMMOGRAM SCREENING BILATERAL  [address]    q6months d/t strong FHX & h/o ADH; Dr Arik Hearn PAP, LIQUID BASED, MANUAL SCREEN  2/2010    normal, [place]    REPAIR OF HAMMERTOE,ONE  2008    right 5th toe         MEDICATIONS      Current Outpatient Medications   Medication Sig    GLUCOSAM/CHOND-MSM1/C/YESI/BOR (GLUCOSAMINE-CHOND-MSM COMPLEX PO) Take 2 Tabs by mouth daily.  zinc 50 mg tab tablet Take  by mouth daily.  XIANG ROOT (XIANG, ZINGIBER OFFICINALIS,) 550 mg cap Take 550 mg by mouth two (2) times a day.  triamcinolone acetonide (KENALOG) 0.1 % topical cream Apply  to affected area daily. Per Derm Dr Jamie Roth for submammary intertrigo  Indications: at bedtime-under breast    nystatin (MYCOSTATIN) topical cream Apply  to affected area daily. Per Derm Dr Jamie Roth for submammary intertrigo  Indications: under Breast    Amino Acids-Whey Pro Con & Iso (WHEY PROTEIN) 25 gram-140 kcal/34 gram powd Take  by mouth.  magnesium 500 mg Tab Take  by mouth nightly.  ascorbic acid (VITAMIN C) 1,000 mg tablet Take 1,000 mg by mouth two (2) times a day.  FLAXSEED OIL PO Take 1 Cap by mouth two (2) times a day.  Cholecalciferol, Vitamin D3, (VITAMIN D) 5,000 unit Tab Take  by mouth daily. Since 2008    multivitamin (ONE A DAY) tablet Take 1 Tab by mouth daily.  omega-3 fatty acids-vitamin e (FISH OIL) 1,000 mg Cap Take  by mouth two (2) times a day.  CALCIUM CARBONATE/VITAMIN D3 (CALCIUM 500 + D, D3, PO) Take  by mouth two (2) times a day. No current facility-administered medications for this visit.            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   Tobacco Use    Smoking status: Never Smoker    Smokeless tobacco: Never Used   Substance and Sexual Activity    Alcohol use: No     Comment: none    Drug use: No    Sexual activity: No     Comment: not since her divorce in 1996   Other Topics Concern     Service No    Blood Transfusions No    Caffeine Concern No     Comment: 2 cups of decaff herbal tea a day; no sodas or coffee    Occupational Exposure No    Hobby Hazards No    Sleep Concern No    Stress Concern No    Weight Concern No    Special Diet Yes     Comment: drinks lots of water; sensible, low fat, mostly baked, lots of veggies and blended juices, fresh fruits    Back Care No    Exercise Yes     Comment: walks 4-5 miles qd x 1 hr; wts 3 x a week    Bike Helmet No     Comment: does not ride   [address] Yes    Self-Exams Yes   Social History Narrative     since 55; Native of [place]; Prior PCP in [facility], Dr Nayeli Simmons; Lives in [place], lives alone in her townhouse. Daughter nearby. Does her own ADL's. Drives. Manages her own medications and financial affairs.  Patient completed Advanced Directive , copy in chart.                     IMMUNIZATIONS  Immunization History   Administered Date(s) Administered    Hep B Vaccine 01/01/2013, 02/01/2013, 06/01/2013    Influenza High Dose Vaccine PF 12/29/2014, 2015, 2016, 10/12/2017    Influenza Vaccine 2012, 2013    Influenza Vaccine (Tri) Adjuvanted 2018    Pneumococcal Conjugate (PCV-13) 2016    Pneumococcal Polysaccharide (PPSV-23) 2014    Zoster Vaccine, Live 2016         FAMILY HISTORY     Family History   Problem Relation Age of Onset    Diabetes Mother     Stroke Mother     Hypertension Mother     High Cholesterol Mother     Hypertension Father     Heart Disease Father     Seizures Maternal Grandmother     Diabetes Maternal Grandfather     Heart Disease Maternal Grandfather     Ovarian Cancer Sister     Breast Cancer Paternal Aunt          in her 42's    Breast Cancer Daughter         dx age 32; my pt; B Mastectomy         VITALS     Visit Vitals  /76 (BP 1 Location: Left arm, BP Patient Position: Sitting)   Pulse 63   Temp 98.3 °F (36.8 °C) (Oral)   Resp 18   Ht 5' 6.5\" (1.689 m)   Wt 222 lb 9.6 oz (101 kg)   SpO2 96%   BMI 35.39 kg/m²          PHYSICAL EXAMINATION     Physical Exam   Constitutional: She is oriented to person, place, and time and well-developed, well-nourished, and in no distress. HENT:   Right Ear: Tympanic membrane normal.   Left Ear: Tympanic membrane normal.   Nose: Nose normal.   Mouth/Throat: Oropharynx is clear and moist. No oropharyngeal exudate. Eyes: Conjunctivae and EOM are normal. Pupils are equal, round, and reactive to light. Neck: Neck supple. No JVD present. No thyromegaly present. Cardiovascular: Normal rate, regular rhythm and normal heart sounds. No murmur heard. Pulmonary/Chest: Effort normal and breath sounds normal.   Abdominal: Soft. There is no tenderness. Musculoskeletal: She exhibits no edema or tenderness. Neurological: She is alert and oriented to person, place, and time. Gait normal.   Skin: Skin is warm.    Psychiatric: Mood, memory and affect normal.   Vitals reviewed.              ASSESSMENT & PLAN       ICD-10-CM ICD-9-CM 1. Medicare annual wellness visit, subsequent Z00.00 V70.0 Separate note, same encounter   2. Need for hepatitis C screening test Z11.59 V73.89 HEPATITIS C AB   3. Encounter for immunization Z23 V03.89 INFLUENZA VACCINE INACTIVATED (IIV), SUBUNIT, ADJUVANTED, IM      ADMIN INFLUENZA VIRUS VAC   4. Benign essential hypertension, controlled w/o medication  I10 401.1 LIPID PANEL      METABOLIC PANEL, COMPREHENSIVE      TSH 3RD GENERATION      URINALYSIS W/ RFLX MICROSCOPIC      CBC W/O DIFF   5. CKD (chronic kidney disease) stage 3, GFR 30-59 ml/min (HCC) Z81.6 264.5 METABOLIC PANEL, COMPREHENSIVE     Fasting labs today  Cardiovascular risk and specific lipid/LDL/BP goals reviewed  Both controlled w/ healthy lifestyle modification only  Reviewed diet, nutrition, exercise, weight management, BMI/goals, age/risk based screening recommendations, health maintenance & prevention counseling. Sees gyn annually for well woman visits/gyn exams. Mammogram screens annually per gyn at [facility]. Colonoscopy screening up to date. Immunizations up to date. Further follow up & other recommendations pending review of labs.  If all remains good and stable, follow up in 1 yr, sooner prn

## 2018-12-21 NOTE — PATIENT INSTRUCTIONS
Medicare Wellness Visit, Female     The best way to live healthy is to have a lifestyle where you eat a well-balanced diet, exercise regularly, limit alcohol use, and quit all forms of tobacco/nicotine, if applicable. Regular preventive services are another way to keep healthy. Preventive services (vaccines, screening tests, monitoring & exams) can help personalize your care plan, which helps you manage your own care. Screening tests can find health problems at the earliest stages, when they are easiest to treat. [facility] follows the current, evidence-based guidelines published by the [facility] ([facility]) when recommending preventive services for our patients. Because we follow these guidelines, sometimes recommendations change over time as research supports it. (For example, mammograms used to be recommended annually. Even though Medicare will still pay for an annual mammogram, the newer guidelines recommend a mammogram every two years for women of average risk.)  Of course, you and your doctor may decide to screen more often for some diseases, based on your risk and your health status. Preventive services for you include:  - Medicare offers their members a free annual wellness visit, which is time for you and your primary care provider to discuss and plan for your preventive service needs. Take advantage of this benefit every year!  -All adults over the age of 72 should receive the recommended pneumonia vaccines. Current USPSTF guidelines recommend a series of two vaccines for the best pneumonia protection.   -All adults should have a flu vaccine yearly and a tetanus vaccine every 10 years. All adults age 61 and older should receive a shingles vaccine once in their lifetime.    -A bone mass density test is recommended when a woman turns 65 to screen for osteoporosis. This test is only recommended one time, as a screening.  Some providers will use this same test as a disease monitoring tool if you already have osteoporosis. -All adults age 38-68 who are overweight should have a diabetes screening test once every three years.   -Other screening tests and preventive services for persons with diabetes include: an eye exam to screen for diabetic retinopathy, a kidney function test, a foot exam, and stricter control over your cholesterol.   -Cardiovascular screening for adults with routine risk involves an electrocardiogram (ECG) at intervals determined by your doctor.   -Colorectal cancer screenings should be done for adults age 54-65 with no increased risk factors for colorectal cancer. There are a number of acceptable methods of screening for this type of cancer. Each test has its own benefits and drawbacks. Discuss with your doctor what is most appropriate for you during your annual wellness visit. The different tests include: colonoscopy (considered the best screening method), a fecal occult blood test, a fecal DNA test, and sigmoidoscopy. -Breast cancer screenings are recommended every other year for women of normal risk, age 54-69.  -Cervical cancer screenings for women over age 72 are only recommended with certain risk factors.   -All adults born between [facility] should be screened once for Hepatitis C. Here is a list of your current Health Maintenance items (your personalized list of preventive services) with a due date:  Health Maintenance Due   Topic Date Due    Shingles Vaccine (1 of 2) 11/20/1999    Glaucoma Screening   12/13/2018            Well Visit, Over 72: Care Instructions  Your Care Instructions    Physical exams can help you stay healthy. Your doctor has checked your overall health and may have suggested ways to take good care of yourself. He or she also may have recommended tests. At home, you can help prevent illness with healthy eating, regular exercise, and other steps. Follow-up care is a key part of your treatment and safety.  Be sure to make and go to all appointments, and call your doctor if you are having problems. It's also a good idea to know your test results and keep a list of the medicines you take. How can you care for yourself at home? · Reach and stay at a healthy weight. This will lower your risk for many problems, such as obesity, diabetes, heart disease, and high blood pressure. · Get at least 30 minutes of exercise on most days of the week. Walking is a good choice. You also may want to do other activities, such as running, swimming, cycling, or playing tennis or team sports. · Do not smoke. Smoking can make health problems worse. If you need help quitting, talk to your doctor about stop-smoking programs and medicines. These can increase your chances of quitting for good. · Protect your skin from too much sun. When you're outdoors from 10 a.m. to 4 p.m., stay in the shade or cover up with clothing and a hat with a wide brim. Wear sunglasses that block UV rays. Even when it's cloudy, put broad-spectrum sunscreen (SPF 30 or higher) on any exposed skin. · See a dentist one or two times a year for checkups and to have your teeth cleaned. · Wear a seat belt in the car. · Limit alcohol to 2 drinks a day for men and 1 drink a day for women. Too much alcohol can cause health problems. Follow your doctor's advice about when to have certain tests. These tests can spot problems early. For men and women  · Cholesterol. Your doctor will tell you how often to have this done based on your overall health and other things that can increase your risk for heart attack and stroke. · Blood pressure. Have your blood pressure checked during a routine doctor visit. Your doctor will tell you how often to check your blood pressure based on your age, your blood pressure results, and other factors. · Diabetes. Ask your doctor whether you should have tests for diabetes. · Vision.  Experts recommend that you have yearly exams for glaucoma and other age-related eye problems. · Hearing. Tell your doctor if you notice any change in your hearing. You can have tests to find out how well you hear. · Colon cancer tests. Keep having colon cancer tests as your doctor recommends. You can have one of several types of tests. · Heart attack and stroke risk. At least every 4 to 6 years, you should have your risk for heart attack and stroke assessed. Your doctor uses factors such as your age, blood pressure, cholesterol, and whether you smoke or have diabetes to show what your risk for a heart attack or stroke is over the next 10 years. · Osteoporosis. Talk to your doctor about whether you should have a bone density test to find out whether you have thinning bones. Also ask your doctor about whether you should take calcium and vitamin D supplements. For women  · Pap test and pelvic exam. You may no longer need a Pap test. Talk with your doctor about whether to stop or continue to have Pap tests. · Breast exam and mammogram. Ask how often you should have a mammogram, which is an X-ray of your breasts. A mammogram can spot breast cancer before it can be felt and when it is easiest to treat. · Thyroid disease. Talk to your doctor about whether to have your thyroid checked as part of a regular physical exam. Women have an increased chance of a thyroid problem. For men  · Prostate exam. Talk to your doctor about whether you should have a blood test (called a PSA test) for prostate cancer. Experts disagree on whether men should have this test. Some experts recommend that you discuss the benefits and risks of the test with your doctor. · Abdominal aortic aneurysm. Ask your doctor whether you should have a test to check for an aneurysm. You may need a test if you ever smoked or if your parent, brother, sister, or child has had an aneurysm. When should you call for help?   Watch closely for changes in your health, and be sure to contact your doctor if you have any problems or symptoms that concern you. Where can you learn more? Go to http://dong-libby.info/. Enter K554 in the search box to learn more about \"Well Visit, Over 65: Care Instructions. \"  Current as of: March 29, 2018  Content Version: 11.8  © 2676-7605 Healthwise, [employer]. Care instructions adapted under license by [employer] (which disclaims liability or warranty for this information). If you have questions about a medical condition or this instruction, always ask your healthcare professional. [address] any warranty or liability for your use of this information.

## 2018-12-21 NOTE — PROGRESS NOTES
Chief Complaint   Patient presents with   [facility] Wellness Visit     -[id number]        1. Have you been to the ER, urgent care clinic since your last visit? Hospitalized since your last visit? No    2. Have you seen or consulted any other health care providers outside of the [address] [place] [address] since your last visit? Include any pap smears or colon screening.    Yes Dermatologist -[phone]

## 2018-12-22 LAB
ALBUMIN SERPL-MCNC: 4.4 G/DL (ref 3.6–4.8)
ALBUMIN/GLOB SERPL: 1.3 {RATIO} (ref 1.2–2.2)
ALP SERPL-CCNC: 88 IU/L (ref 39–117)
ALT SERPL-CCNC: 24 IU/L (ref 0–32)
APPEARANCE UR: CLEAR
AST SERPL-CCNC: 24 IU/L (ref 0–40)
BILIRUB SERPL-MCNC: 0.9 MG/DL (ref 0–1.2)
BILIRUB UR QL STRIP: NEGATIVE
BUN SERPL-MCNC: 17 MG/DL (ref 8–27)
BUN/CREAT SERPL: 14 (ref 12–28)
CALCIUM SERPL-MCNC: 9.8 MG/DL (ref 8.7–10.3)
CHLORIDE SERPL-SCNC: 102 MMOL/L (ref 96–106)
CHOLEST SERPL-MCNC: 144 MG/DL (ref 100–199)
CO2 SERPL-SCNC: 23 MMOL/L (ref 20–29)
COLOR UR: YELLOW
CREAT SERPL-MCNC: 1.24 MG/DL (ref 0.57–1)
ERYTHROCYTE [DISTWIDTH] IN BLOOD BY AUTOMATED COUNT: 14.4 % (ref 12.3–15.4)
GLOBULIN SER CALC-MCNC: 3.3 G/DL (ref 1.5–4.5)
GLUCOSE SERPL-MCNC: 83 MG/DL (ref 65–99)
GLUCOSE UR QL: NEGATIVE
HCT VFR BLD AUTO: 43.3 % (ref 34–46.6)
HCV AB S/CO SERPL IA: <0.1 S/CO RATIO (ref 0–0.9)
HDLC SERPL-MCNC: 52 MG/DL
HGB BLD-MCNC: 14.2 G/DL (ref 11.1–15.9)
HGB UR QL STRIP: NEGATIVE
INTERPRETATION, 910389: NORMAL
INTERPRETATION: NORMAL
KETONES UR QL STRIP: NEGATIVE
LDLC SERPL CALC-MCNC: 83 MG/DL (ref 0–99)
LEUKOCYTE ESTERASE UR QL STRIP: NEGATIVE
MCH RBC QN AUTO: 27 PG (ref 26.6–33)
MCHC RBC AUTO-ENTMCNC: 32.8 G/DL (ref 31.5–35.7)
MCV RBC AUTO: 82 FL (ref 79–97)
MICRO URNS: NORMAL
NITRITE UR QL STRIP: NEGATIVE
PDF IMAGE, 910387: NORMAL
PH UR STRIP: 6 [PH] (ref 5–7.5)
PLATELET # BLD AUTO: 282 X10E3/UL (ref 150–379)
POTASSIUM SERPL-SCNC: 4.7 MMOL/L (ref 3.5–5.2)
PROT SERPL-MCNC: 7.7 G/DL (ref 6–8.5)
PROT UR QL STRIP: NEGATIVE
RBC # BLD AUTO: 5.26 X10E6/UL (ref 3.77–5.28)
SODIUM SERPL-SCNC: 140 MMOL/L (ref 134–144)
SP GR UR: 1.01 (ref 1–1.03)
TRIGL SERPL-MCNC: 46 MG/DL (ref 0–149)
TSH SERPL DL<=0.005 MIU/L-ACNC: 2.51 UIU/ML (ref 0.45–4.5)
UROBILINOGEN UR STRIP-MCNC: 0.2 MG/DL (ref 0.2–1)
VLDLC SERPL CALC-MCNC: 9 MG/DL (ref 5–40)
WBC # BLD AUTO: 4.9 X10E3/UL (ref 3.4–10.8)

## 2019-01-01 ENCOUNTER — TELEPHONE (OUTPATIENT)
Dept: FAMILY MEDICINE CLINIC | Age: 69
End: 2019-01-01

## 2019-08-12 ENCOUNTER — OFFICE VISIT (OUTPATIENT)
Dept: FAMILY MEDICINE CLINIC | Age: 69
End: 2019-08-12

## 2019-08-12 VITALS
WEIGHT: 224.4 LBS | HEART RATE: 73 BPM | RESPIRATION RATE: 18 BRPM | SYSTOLIC BLOOD PRESSURE: 144 MMHG | HEIGHT: 67 IN | DIASTOLIC BLOOD PRESSURE: 76 MMHG | TEMPERATURE: 98.2 F | BODY MASS INDEX: 35.22 KG/M2 | OXYGEN SATURATION: 99 %

## 2019-08-12 DIAGNOSIS — H26.9 CATARACT OF BOTH EYES, UNSPECIFIED CATARACT TYPE: ICD-10-CM

## 2019-08-12 DIAGNOSIS — I10 BENIGN ESSENTIAL HYPERTENSION: ICD-10-CM

## 2019-08-12 DIAGNOSIS — Z01.818 PREOP EXAMINATION: Primary | ICD-10-CM

## 2019-08-12 NOTE — PATIENT INSTRUCTIONS
High Blood Pressure: Care Instructions Overview It's normal for blood pressure to go up and down throughout the day. But if it stays up, you have high blood pressure. Another name for high blood pressure is hypertension. Despite what a lot of people think, high blood pressure usually doesn't cause headaches or make you feel dizzy or lightheaded. It usually has no symptoms. But it does increase your risk of stroke, heart attack, and other problems. You and your doctor will talk about your risks of these problems based on your blood pressure. Your doctor will give you a goal for your blood pressure. Your goal will be based on your health and your age. Lifestyle changes, such as eating healthy and being active, are always important to help lower blood pressure. You might also take medicine to reach your blood pressure goal. 
Follow-up care is a key part of your treatment and safety. Be sure to make and go to all appointments, and call your doctor if you are having problems. It's also a good idea to know your test results and keep a list of the medicines you take. How can you care for yourself at home? Medical treatment · If you stop taking your medicine, your blood pressure will go back up. You may take one or more types of medicine to lower your blood pressure. Be safe with medicines. Take your medicine exactly as prescribed. Call your doctor if you think you are having a problem with your medicine. · Talk to your doctor before you start taking aspirin every day. Aspirin can help certain people lower their risk of a heart attack or stroke. But taking aspirin isn't right for everyone, because it can cause serious bleeding. · See your doctor regularly. You may need to see the doctor more often at first or until your blood pressure comes down. · If you are taking blood pressure medicine, talk to your doctor before you take decongestants or anti-inflammatory medicine, such as ibuprofen. Some of these medicines can raise blood pressure. · Learn how to check your blood pressure at home. Lifestyle changes · Stay at a healthy weight. This is especially important if you put on weight around the waist. Losing even 10 pounds can help you lower your blood pressure. · If your doctor recommends it, get more exercise. Walking is a good choice. Bit by bit, increase the amount you walk every day. Try for at least 30 minutes on most days of the week. You also may want to swim, bike, or do other activities. · Avoid or limit alcohol. Talk to your doctor about whether you can drink any alcohol. · Try to limit how much sodium you eat to less than 2,300 milligrams (mg) a day. Your doctor may ask you to try to eat less than 1,500 mg a day. · Eat plenty of fruits (such as bananas and oranges), vegetables, legumes, whole grains, and low-fat dairy products. · Lower the amount of saturated fat in your diet. Saturated fat is found in animal products such as milk, cheese, and meat. Limiting these foods may help you lose weight and also lower your risk for heart disease. · Do not smoke. Smoking increases your risk for heart attack and stroke. If you need help quitting, talk to your doctor about stop-smoking programs and medicines. These can increase your chances of quitting for good. When should you call for help? Call 911 anytime you think you may need emergency care. This may mean having symptoms that suggest that your blood pressure is causing a serious heart or blood vessel problem. Your blood pressure may be over 180/120. 
 For example, call 911 if: 
  · You have symptoms of a heart attack. These may include: 
? Chest pain or pressure, or a strange feeling in the chest. 
? Sweating. ? Shortness of breath. ? Nausea or vomiting. ? Pain, pressure, or a strange feeling in the back, neck, jaw, or upper belly or in one or both shoulders or arms. ? Lightheadedness or sudden weakness. ? A fast or irregular heartbeat.  
  · You have symptoms of a stroke. These may include: 
? Sudden numbness, tingling, weakness, or loss of movement in your face, arm, or leg, especially on only one side of your body. ? Sudden vision changes. ? Sudden trouble speaking. ? Sudden confusion or trouble understanding simple statements. ? Sudden problems with walking or balance. ? A sudden, severe headache that is different from past headaches.  
  · You have severe back or belly pain.  
 Do not wait until your blood pressure comes down on its own. Get help right away. 
 Call your doctor now or seek immediate care if: 
  · Your blood pressure is much higher than normal (such as 180/120 or higher), but you don't have symptoms.  
  · You think high blood pressure is causing symptoms, such as: 
? Severe headache. 
? Blurry vision.  
 Watch closely for changes in your health, and be sure to contact your doctor if: 
  · Your blood pressure measures higher than your doctor recommends at least 2 times. That means the top number is higher or the bottom number is higher, or both.  
  · You think you may be having side effects from your blood pressure medicine. Where can you learn more? Go to http://dong-libby.info/. Enter S849 in the search box to learn more about \"High Blood Pressure: Care Instructions. \" Current as of: July 22, 2018 Content Version: 12.1 © 7279-7904 Healthwise, Incorporated. Care instructions adapted under license by [employer] (which disclaims liability or warranty for this information). If you have questions about a medical condition or this instruction, always ask your healthcare professional. [place] [address] any warranty or liability for your use of this information.

## 2019-08-12 NOTE — PROGRESS NOTES
Chief Complaint   Patient presents with    Pre-op Exam     cataract surgery both eyes     HISTORY OF PRESENT ILLNESS   HPI  PRE-OP H&P    Surgery: Cataract extraction with lens implant both eyes    Date of Surgery: OD 8-, OS 8-    Surgeon: Dr. Donald Dubose    Anesthesia: Regional    Latex Allergy: No    History of Reactions to Anesthesia: No    History of Heart Disease/CAD/PTCA/CABG: No    ASA: No    Coumadin/Xarelto/Eliquis/Pradaxa: No    Nsaid's: No    Asthma/COPD/Pulmonary Disease: No    Bleeding/Bruising or Clotting Disorder: No    H/O DVT or Pulmonary Embolism: No    Other ROS per pre-op form, non-contributory       REVIEW OF SYMPTOMS   Review of Systems   Constitutional: Negative. HENT: Negative. Respiratory: Negative. Cardiovascular: Negative. Gastrointestinal: Negative. Genitourinary: Negative. Musculoskeletal: Negative. Neurological: Negative. Endo/Heme/Allergies: Negative. Does not bruise/bleed easily. PROBLEM LIST/MEDICAL HISTORY     Patient Active Problem List   Diagnosis Code    Retinal hemorrhage, right eye H35.61    Fibrocystic breasts N60.19    Postmenopause s/p hysterectomy s/p HRT 0681-8832 Z78.0    H/O Mild Chronic Renal Insufficiency N28.9    Strong FHx: Breast Cancer Z80.3    S/P colonoscopy 2009, 2014 Z98.890    Sleep related leg cramps G47.62    Atypical ductal hyperplasia of left breast, 2005 N60.99    Lipoma of right breast, 2000 D17.1    Cataract, right eye H26.9    Advance care planning Z71.89    Acute onset of severe vertigo R42    Submammary Candidal Intertrigo B37.2    Migraine without aura G43.009    CKD (chronic kidney disease) stage 3, GFR 30-59 ml/min ([facility]) N18.3    Benign essential hypertension I10       PAST SURGICAL HISTORY     Past Surgical History:   Procedure Laterality Date    COLONOSCOPY  ~2009    recommended f/u 5 yrs    HX BREAST BIOPSY  4/2005, Dr Lois Walker    left breast, atypical ductal hyperplasia;   [facility]    HX BREAST LUMPECTOMY      HX COLONOSCOPY  3/2014    Colon polyp, Dr Aime Sanchez, f/u 3 yrs    HX COLONOSCOPY  04/01/2017    \"normal\", repeat 10 yrs, Dr Jesse King Left 11/14/2017    left lateral knee     HX CYNTHIA AND BSO  2002    AUB, Fibroids    TAYA MAMMOGRAM SCREENING BILATERAL  Routinely at [address]    q6months d/t strong FHX & h/o ADH; Dr Alee Jade PAP, LIQUID BASED, MANUAL SCREEN  2/2010    normal, [place]   [address]  2008    right 5th toe     MEDICATIONS     Current Outpatient Medications   Medication Sig    DANDELION ROOT PO Take  by mouth daily.  GLUCOSAM/CHOND-MSM1/C/YESI/BOR (GLUCOSAMINE-CHOND-MSM COMPLEX PO) Take 1 Tab by mouth daily.  zinc 50 mg tab tablet Take  by mouth daily.  XIANG ROOT (XIANG, ZINGIBER OFFICINALIS,) 550 mg cap Take 550 mg by mouth two (2) times a day.  nystatin (MYCOSTATIN) topical cream Apply  to affected area daily. Per Derm Dr Gala Wood for submammary intertrigo  Indications: under Breast    Amino Acids-Whey Pro Con & Iso (WHEY PROTEIN) 25 gram-140 kcal/34 gram powd Take  by mouth daily.  magnesium 500 mg Tab Take  by mouth nightly.  ascorbic acid (VITAMIN C) 1,000 mg tablet Take 1,000 mg by mouth two (2) times a day.  FLAXSEED OIL PO Take 1 Cap by mouth two (2) times a day.  Cholecalciferol, Vitamin D3, (VITAMIN D) 5,000 unit Tab Take  by mouth daily. Since 2008    multivitamin (ONE A DAY) tablet Take 1 Tab by mouth daily.  omega-3 fatty acids-vitamin e (FISH OIL) 1,000 mg Cap Take  by mouth two (2) times a day.  CALCIUM CARBONATE/VITAMIN D3 (CALCIUM 500 + D, D3, PO) Take  by mouth two (2) times a day. No current facility-administered medications for this visit.        ALLERGIES     Allergies   Allergen Reactions    Codeine Itching      SOCIAL HISTORY     Socioeconomic History    Marital status:     Number of children: 3   Occupational History    Occupation: Retired from her job May 2014: formerly at [employer]   Tobacco Use    Smoking status: Never Smoker    Smokeless tobacco: Never Used   Substance and Sexual Activity    Alcohol use: No     Comment: none    Drug use: No    Sexual activity: Never     Comment: not since her divorce in    Other Topics Concern     Service No    Blood Transfusions No    Caffeine Concern No     Comment: 2 cups of decaff herbal tea a day; no sodas or coffee    Special Diet Yes     Comment: drinks lots of water; sensible, low fat, mostly baked, lots of veggies and blended juices, fresh fruits    Back Care No    Exercise Yes     Comment: walks 4-5 miles qd x 1 hr; wts 3 x a week   Social History Narrative     since 55; Native of [place]; Prior PCP in [place], Dr Bal Estimable; Lives in [place], lives alone in her townhouse. Daughter nearby. Does her own ADL's. Drives. Manages her own medications and financial affairs. Patient completed Advanced Directive , copy in chart.                   IMMUNIZATIONS  Immunization History   Administered Date(s) Administered    Hep B Vaccine 2013, 2013, 2013    Influenza High Dose Vaccine PF 2014, 2015, 2016, 10/12/2017    Influenza Vaccine 2012, 2013    Influenza Vaccine (Tri) Adjuvanted 2018    Pneumococcal Conjugate (PCV-13) 2016    Pneumococcal Polysaccharide (PPSV-23) 2014    Zoster Vaccine, Live 2016     FAMILY HISTORY     Family History   Problem Relation Age of Onset    Diabetes Mother     Stroke Mother     Hypertension Mother     High Cholesterol Mother     Hypertension Father     Heart Disease Father     Seizures Maternal Grandmother     Diabetes Maternal Grandfather     Heart Disease Maternal Grandfather     Ovarian Cancer Sister     Breast Cancer Paternal Aunt          in her 42's  Breast Cancer Daughter         dx age 32; my pt; B Mastectomy         VITALS     Visit Vitals  /76 (BP 1 Location: Left arm, BP Patient Position: Sitting)   Pulse 73   Temp 98.2 °F (36.8 °C) (Oral)   Resp 18   Ht 5' 6.5\" (1.689 m)   Wt 224 lb 6.4 oz (101.8 kg)   SpO2 99%   BMI 35.68 kg/m²      PHYSICAL EXAMINATION   Physical Exam   Constitutional: She is oriented to person, place, and time and well-developed, well-nourished, and in no distress. HENT:   Right Ear: Tympanic membrane normal.   Left Ear: Tympanic membrane normal.   Nose: Nose normal.   Mouth/Throat: Oropharynx is clear and moist. No oropharyngeal exudate. Eyes: Pupils are equal, round, and reactive to light. Conjunctivae and EOM are normal.   Neck: Neck supple. No JVD present. Carotid bruit is not present. No thyromegaly present. Cardiovascular: Normal rate, regular rhythm and normal heart sounds. No murmur heard. Pulmonary/Chest: Effort normal and breath sounds normal. No respiratory distress. Abdominal: Soft. Bowel sounds are normal. She exhibits no distension and no mass. There is no tenderness. Musculoskeletal: Normal range of motion. She exhibits no tenderness. Lymphadenopathy:     She has no cervical adenopathy. Neurological: She is alert and oriented to person, place, and time. Gait normal.   Skin: Skin is warm and dry. Psychiatric: Mood, affect and judgment normal.   Vitals reviewed. ASSESSMENT & PLAN       ICD-10-CM ICD-9-CM    1. Preop H&P for Cataract Extraction with Lens Implant Both Eyes under Regional Anesthesia Z01.818 V72.84 Patient medically stable and clear for cataract surgery OD 8-14-19 and OS 8-28-19 per Dr. Cherylene Coe. Even though low risk of bleeding, she will hold her vitamins and supplements x 3 days prior to each surgery. 2. Cataract of both eyes, unspecified cataract type H26.9 366.9    3.  Benign essential hypertension, stable I10 401.1        CC: Office note H&P along w/ pre-op form to  [facility], Monty Gray, Fax # [phone]

## 2019-08-12 NOTE — PROGRESS NOTES
Chief Complaint   Patient presents with    Pre-op Exam     cataract surgery     Patient is having cataract surgery on right eye by Dr. Michael Michael with [facility] on 8/14/201. .  1. Have you been to the ER, urgent care clinic since your last visit? Hospitalized since your last visit? No    2. Have you seen or consulted any other health care providers outside of the [address] [place] [address] since your last visit? Include any pap smears or colon screening.  No    Visit Vitals  /76 (BP 1 Location: Left arm, BP Patient Position: Sitting)   Pulse 73   Temp 98.2 °F (36.8 °C) (Oral)   Resp 18   Ht 5' 6.5\" (1.689 m)   Wt 224 lb 6.4 oz (101.8 kg)   SpO2 99%   BMI 35.68 kg/m²

## 2019-12-20 ENCOUNTER — OFFICE VISIT (OUTPATIENT)
Dept: FAMILY MEDICINE CLINIC | Age: 69
End: 2019-12-20

## 2019-12-20 ENCOUNTER — HOSPITAL ENCOUNTER (OUTPATIENT)
Dept: LAB | Age: 69
Discharge: HOME | End: 2019-12-20
Payer: MEDICARE

## 2019-12-20 VITALS
OXYGEN SATURATION: 95 % | SYSTOLIC BLOOD PRESSURE: 120 MMHG | HEART RATE: 60 BPM | WEIGHT: 226 LBS | TEMPERATURE: 98.6 F | DIASTOLIC BLOOD PRESSURE: 70 MMHG | HEIGHT: 67 IN | BODY MASS INDEX: 35.47 KG/M2 | RESPIRATION RATE: 16 BRPM

## 2019-12-20 DIAGNOSIS — I10 BENIGN ESSENTIAL HYPERTENSION: ICD-10-CM

## 2019-12-20 DIAGNOSIS — S69.91XA INJURY, THUMB, RIGHT, INITIAL ENCOUNTER: ICD-10-CM

## 2019-12-20 DIAGNOSIS — N18.30 CKD (CHRONIC KIDNEY DISEASE) STAGE 3, GFR 30-59 ML/MIN (HCC): ICD-10-CM

## 2019-12-20 DIAGNOSIS — Z00.00 MEDICARE ANNUAL WELLNESS VISIT, SUBSEQUENT: Primary | ICD-10-CM

## 2019-12-20 DIAGNOSIS — Z12.11 SCREEN FOR COLON CANCER: ICD-10-CM

## 2019-12-20 DIAGNOSIS — Z23 ENCOUNTER FOR IMMUNIZATION: ICD-10-CM

## 2019-12-20 PROCEDURE — 85027 COMPLETE CBC AUTOMATED: CPT

## 2019-12-20 PROCEDURE — 36415 COLL VENOUS BLD VENIPUNCTURE: CPT

## 2019-12-20 PROCEDURE — 80053 COMPREHEN METABOLIC PANEL: CPT

## 2019-12-20 PROCEDURE — 84443 ASSAY THYROID STIM HORMONE: CPT

## 2019-12-20 PROCEDURE — 80061 LIPID PANEL: CPT

## 2019-12-20 PROCEDURE — 81003 URINALYSIS AUTO W/O SCOPE: CPT

## 2019-12-20 NOTE — PATIENT INSTRUCTIONS
Medicare Wellness Visit, Female The best way to live healthy is to have a lifestyle where you eat a well-balanced diet, exercise regularly, limit alcohol use, and quit all forms of tobacco/nicotine, if applicable. Regular preventive services are another way to keep healthy. Preventive services (vaccines, screening tests, monitoring & exams) can help personalize your care plan, which helps you manage your own care. Screening tests can find health problems at the earliest stages, when they are easiest to treat. [facility] follows the current, evidence-based guidelines published by the [facility] Gonzales ([facility]) when recommending preventive services for our patients. Because we follow these guidelines, sometimes recommendations change over time as research supports it. (For example, mammograms used to be recommended annually. Even though Medicare will still pay for an annual mammogram, the newer guidelines recommend a mammogram every two years for women of average risk). Of course, you and your doctor may decide to screen more often for some diseases, based on your risk and your co-morbidities (chronic disease you are already diagnosed with). Preventive services for you include: - Medicare offers their members a free annual wellness visit, which is time for you and your primary care provider to discuss and plan for your preventive service needs. Take advantage of this benefit every year! 
-All adults over the age of 72 should receive the recommended pneumonia vaccines. Current USPSTF guidelines recommend a series of two vaccines for the best pneumonia protection.  
-All adults should have a flu vaccine yearly and a tetanus vaccine every 10 years.  
-All adults age 48 and older should receive the shingles vaccines (series of two vaccines). -All adults age 38-68 who are overweight should have a diabetes screening test once every three years. -All adults born between [birth year] and 1965 should be screened once for Hepatitis C. 
-Other screening tests and preventive services for persons with diabetes include: an eye exam to screen for diabetic retinopathy, a kidney function test, a foot exam, and stricter control over your cholesterol.  
-Cardiovascular screening for adults with routine risk involves an electrocardiogram (ECG) at intervals determined by your doctor.  
-Colorectal cancer screenings should be done for adults age 54-65 with no increased risk factors for colorectal cancer. There are a number of acceptable methods of screening for this type of cancer. Each test has its own benefits and drawbacks. Discuss with your doctor what is most appropriate for you during your annual wellness visit. The different tests include: colonoscopy (considered the best screening method), a fecal occult blood test, a fecal DNA test, and sigmoidoscopy. 
 
-A bone mass density test is recommended when a woman turns 65 to screen for osteoporosis. This test is only recommended one time, as a screening. Some providers will use this same test as a disease monitoring tool if you already have osteoporosis. -Breast cancer screenings are recommended every other year for women of normal risk, age 54-69. 
-Cervical cancer screenings for women over age 72 are only recommended with certain risk factors. Here is a list of your current Health Maintenance items (your personalized list of preventive services) with a due date: 
Health Maintenance Due Topic Date Due  Shingles Vaccine (1 of 2) 11/20/1999 Medicare Wellness Visit, Female The best way to live healthy is to have a lifestyle where you eat a well-balanced diet, exercise regularly, limit alcohol use, and quit all forms of tobacco/nicotine, if applicable. Regular preventive services are another way to keep healthy.  Preventive services (vaccines, screening tests, monitoring & exams) can help personalize your care plan, which helps you manage your own care. Screening tests can find health problems at the earliest stages, when they are easiest to treat. [facility] follows the current, evidence-based guidelines published by the [facility] Gonzales ([facility]) when recommending preventive services for our patients. Because we follow these guidelines, sometimes recommendations change over time as research supports it. (For example, mammograms used to be recommended annually. Even though Medicare will still pay for an annual mammogram, the newer guidelines recommend a mammogram every two years for women of average risk). Of course, you and your doctor may decide to screen more often for some diseases, based on your risk and your co-morbidities (chronic disease you are already diagnosed with). Preventive services for you include: - Medicare offers their members a free annual wellness visit, which is time for you and your primary care provider to discuss and plan for your preventive service needs. Take advantage of this benefit every year! 
-All adults over the age of 72 should receive the recommended pneumonia vaccines. Current USPSTF guidelines recommend a series of two vaccines for the best pneumonia protection.  
-All adults should have a flu vaccine yearly and a tetanus vaccine every 10 years.  
-All adults age 48 and older should receive the shingles vaccines (series of two vaccines).      
-All adults age 38-68 who are overweight should have a diabetes screening test once every three years.  
-All adults born between 80 and 1965 should be screened once for Hepatitis C. 
-Other screening tests and preventive services for persons with diabetes include: an eye exam to screen for diabetic retinopathy, a kidney function test, a foot exam, and stricter control over your cholesterol.  
-Cardiovascular screening for adults with routine risk involves an electrocardiogram (ECG) at intervals determined by your doctor.  
-Colorectal cancer screenings should be done for adults age 54-65 with no increased risk factors for colorectal cancer. There are a number of acceptable methods of screening for this type of cancer. Each test has its own benefits and drawbacks. Discuss with your doctor what is most appropriate for you during your annual wellness visit. The different tests include: colonoscopy (considered the best screening method), a fecal occult blood test, a fecal DNA test, and sigmoidoscopy. 
 
-A bone mass density test is recommended when a woman turns 65 to screen for osteoporosis. This test is only recommended one time, as a screening. Some providers will use this same test as a disease monitoring tool if you already have osteoporosis. -Breast cancer screenings are recommended every other year for women of normal risk, age 54-69. 
-Cervical cancer screenings for women over age 72 are only recommended with certain risk factors. Here is a list of your current Health Maintenance items (your personalized list of preventive services) with a due date: 
Health Maintenance Due Topic Date Due  Shingles Vaccine (1 of 2) 11/20/1999

## 2019-12-20 NOTE — PROGRESS NOTES
Chief Complaint   Patient presents with   [facility] Annual Wellness Visit         Injury     denver poked into her thumb 2 days ago but didnt get stuck and didnt bleed    Immunization/Injection     TDAP     1. Have you been to the ER, urgent care clinic since your last visit? Hospitalized since your last visit? No    2. Have you seen or consulted any other health care providers outside of the [address] [place] [address] since your last visit? Include any pap smears or colon screening. No    Tracy Maria  is a 78 y.o.  female  who present for TDAP immunizations/injections. He/she denies any symptoms , reactions or allergies that would exclude them from being immunized today. Risks and adverse reactions were discussed and the VIS was given if applicable to them. All questions were addressed. He/She was observed for 10 min post injection. There were no reactions observed.     Adan Alvarado LPN

## 2019-12-20 NOTE — PROGRESS NOTES
This is the Subsequent Medicare Annual Wellness Exam, performed 12 months or more after the Initial AWV or the last Subsequent AWV    I have reviewed the patient's medical history in detail and updated the computerized patient record. History     Patient Active Problem List   Diagnosis Code    Retinal hemorrhage, right eye H35.61    Fibrocystic breasts N60.19    Postmenopause s/p hysterectomy s/p HRT 9028-1479 Z78.0    H/O Mild Chronic Renal Insufficiency N28.9    Strong FHx: Breast Cancer Z80.3    S/P colonoscopy 2009, 2014 Z98.890    Sleep related leg cramps G47.62    Atypical ductal hyperplasia of left breast, 2005 N60.99    Lipoma of right breast, 2000 D17.1    Cataract, right eye H26.9    Advance care planning Z71.89    Acute onset of severe vertigo R42    Submammary Candidal Intertrigo B37.2    Migraine without aura G43.009    CKD (chronic kidney disease) stage 3, GFR 30-59 ml/min (HCC) N18.3    Benign essential hypertension I10     Past Medical History:   Diagnosis Date    Acute onset of severe vertigo 12/9/2016 9-21-16 [facility]: labs, cardiac workup and brain MRI normal; dx w/ inner ear; resolved    Advance care planning 12/8/2015    Advance care planning 12/8/2015    Initial ACP discussion. AMD form reviewed and copy provided.  NN to d/w pt 12-8-15    Atypical ductal hyperplasia of left breast, 2005 12/29/2011    Benign essential hypertension 12/15/2017    Cataract, right eye 12/29/2011    CKD (chronic kidney disease) stage 3, GFR 30-59 ml/min ([facility]) 1/2/2017    Referred to nephrology 1-2017    Fibrocystic breasts 8/16/2010    H/O Chronic Renal Insufficiency 8/16/2010    Lipoma of right breast, 2000 12/29/2011    Migraine without aura 12/9/2016    In her 30's: triggered by cheese, cow's milk, certain sweets/chocolate    Postmenopause s/p hysterectomy s/p HRT [id number] 8/16/2010    Retinal hemorrhage, right eye 8/16/2010    S/P colonoscopy 2009, 2014 11/9/2010    [facility]; F/U 3 yrs recommended d/t poor study/incomplete; had Barium Enema study instead which was normal; Colonoscopy 3/2014 Dr Corbin Britton, polyp    Sleep related leg cramps 12/29/2011    Strong FHx: Breast Cancer 8/16/2010    Submammary Candidal Intertrigo 12/9/2016    Dermatologist in [address] [place] [address], Dr Meron Ross    Vertigo 09/2016      Past Surgical History:   Procedure Laterality Date    COLONOSCOPY  ~2009    recommended f/u 5 yrs    HX BREAST BIOPSY  4/2005, Dr Soham Bruner    left breast, atypical ductal hyperplasia; Dr Soham Bruner, [address] CATARACT REMOVAL  08/14/2019    right eye cataract     HX CATARACT REMOVAL  08/28/2019    left eye cataract     HX COLONOSCOPY  3/2014    Colon polyp, Dr Corbin Britton, f/u 3 yrs    HX COLONOSCOPY  04/01/2017    \"normal\", repeat 10 yrs, Dr Willow Barrientos Left 11/14/2017    left lateral knee     HX CYNTHIA AND BSO  2002    AUB, Fibroids    TAYA MAMMOGRAM SCREENING BILATERAL  [address]    q6months d/t strong FHX & h/o ADH; Dr Terese Jordan PAP, LIQUID BASED, MANUAL SCREEN  2/2010    normal, [facility]   [address]  2008    right 5th toe     Current Outpatient Medications   Medication Sig Dispense Refill    DANDELION ROOT PO Take  by mouth daily.  GLUCOSAM/CHOND-MSM1/C/YESI/BOR (GLUCOSAMINE-CHOND-MSM COMPLEX PO) Take 1 Tab by mouth daily.  zinc 50 mg tab tablet Take  by mouth daily.  XIANG ROOT (XIANG, ZINGIBER OFFICINALIS,) 550 mg cap Take 550 mg by mouth two (2) times a day.  Amino Acids-Whey Pro Con & Iso (WHEY PROTEIN) 25 gram-140 kcal/34 gram powd Take  by mouth daily.  magnesium 500 mg Tab Take  by mouth nightly.  ascorbic acid (VITAMIN C) 1,000 mg tablet Take 1,000 mg by mouth two (2) times a day.  FLAXSEED OIL PO Take 1 Cap by mouth two (2) times a day.       Cholecalciferol, Vitamin D3, (VITAMIN D) 5,000 unit Tab Take by mouth daily. Since       multivitamin (ONE A DAY) tablet Take 1 Tab by mouth daily.  omega-3 fatty acids-vitamin e (FISH OIL) 1,000 mg Cap Take  by mouth two (2) times a day.  CALCIUM CARBONATE/VITAMIN D3 (CALCIUM 500 + D, D3, PO) Take  by mouth two (2) times a day. Allergies   Allergen Reactions    Codeine Itching       Family History   Problem Relation Age of Onset    Diabetes Mother     Stroke Mother     Hypertension Mother     High Cholesterol Mother     Hypertension Father     Heart Disease Father     Seizures Maternal Grandmother     Diabetes Maternal Grandfather     Heart Disease Maternal Grandfather     Ovarian Cancer Sister     Breast Cancer Paternal Aunt          in her 42's    Breast Cancer Daughter         dx age 32; my pt; B Mastectomy     Social History     Tobacco Use    Smoking status: Never Smoker    Smokeless tobacco: Never Used   Substance Use Topics    Alcohol use: No     Comment: none       Depression Risk Factor Screening:     3 most recent PHQ Screens 2019   Little interest or pleasure in doing things Not at all   Feeling down, depressed, irritable, or hopeless Not at all   Total Score PHQ 2 0       Alcohol Risk Factor Screening:   Do you average 1 drink per night or more than 7 drinks a week:  No    On any one occasion in the past three months have you have had more than 3 drinks containing alcohol:  No      Functional Ability and Level of Safety:   Hearing: Hearing is good. Activities of Daily Living: The home contains: no safety equipment.   Patient does total self care  ADL Assessment 2019   Feeding yourself No Help Needed   Getting from bed to chair No Help Needed   Getting dressed No Help Needed   Bathing or showering No Help Needed   Walk across the room (includes cane/walker) No Help Needed   Using the telphone No Help Needed   Taking your medications No Help Needed   Preparing meals No Help Needed   Managing money (expenses/bills) No Help Needed   Moderately strenuous housework (laundry) No Help Needed   Shopping for personal items (toiletries/medicines) No Help Needed   Shopping for groceries No Help Needed   Driving No Help Needed   Climbing a flight of stairs No Help Needed   Getting to places beyond walking distances No Help Needed       Ambulation: with no difficulty    Fall Risk:  Fall Risk Assessment, last 12 mths 12/20/2019   Able to walk? Yes   Fall in past 12 months? No       Abuse Screen:  Patient is not abused    Cognitive Screening   Has your family/caregiver stated any concerns about your memory: no      Patient Care Team   Patient Care Team:  Milad Carey MD as PCP - General  Milad Carey MD as PCP - [facility]    Assessment/Plan   Education and counseling provided:  Are appropriate based on today's review and evaluation  Colorectal cancer screening tests    Diagnoses and all orders for this visit:    1. Medicare annual wellness visit, subsequent    2.  Screen for colon cancer  -     OCCULT BLOOD IMMUNOASSAY,DIAGNOSTIC        Health Maintenance Due   Topic Date Due    Shingrix Vaccine Age 49> (1 of 2) 11/20/1999

## 2019-12-21 LAB
ALBUMIN SERPL-MCNC: 4.3 G/DL (ref 3.5–4.8)
ALBUMIN/GLOB SERPL: 1.4 {RATIO} (ref 1.2–2.2)
ALP SERPL-CCNC: 83 IU/L (ref 39–117)
ALT SERPL-CCNC: 20 IU/L (ref 0–32)
APPEARANCE UR: CLEAR
AST SERPL-CCNC: 22 IU/L (ref 0–40)
BILIRUB SERPL-MCNC: 0.9 MG/DL (ref 0–1.2)
BILIRUB UR QL STRIP: NEGATIVE
BUN SERPL-MCNC: 14 MG/DL (ref 8–27)
BUN/CREAT SERPL: 11 (ref 12–28)
CALCIUM SERPL-MCNC: 9.6 MG/DL (ref 8.7–10.3)
CHLORIDE SERPL-SCNC: 102 MMOL/L (ref 96–106)
CHOLEST SERPL-MCNC: 144 MG/DL (ref 100–199)
CO2 SERPL-SCNC: 22 MMOL/L (ref 20–29)
COLOR UR: YELLOW
CREAT SERPL-MCNC: 1.23 MG/DL (ref 0.57–1)
ERYTHROCYTE [DISTWIDTH] IN BLOOD BY AUTOMATED COUNT: 12.8 % (ref 12.3–15.4)
GLOBULIN SER CALC-MCNC: 3 G/DL (ref 1.5–4.5)
GLUCOSE SERPL-MCNC: 83 MG/DL (ref 65–99)
GLUCOSE UR QL: NEGATIVE
HCT VFR BLD AUTO: 42.3 % (ref 34–46.6)
HDLC SERPL-MCNC: 55 MG/DL
HGB BLD-MCNC: 14.3 G/DL (ref 11.1–15.9)
HGB UR QL STRIP: NEGATIVE
INTERPRETATION, 910389: NORMAL
INTERPRETATION: NORMAL
KETONES UR QL STRIP: NEGATIVE
LDLC SERPL CALC-MCNC: 77 MG/DL (ref 0–99)
LEUKOCYTE ESTERASE UR QL STRIP: NEGATIVE
MCH RBC QN AUTO: 27.9 PG (ref 26.6–33)
MCHC RBC AUTO-ENTMCNC: 33.8 G/DL (ref 31.5–35.7)
MCV RBC AUTO: 83 FL (ref 79–97)
MICRO URNS: NORMAL
NITRITE UR QL STRIP: NEGATIVE
PDF IMAGE, 910387: NORMAL
PH UR STRIP: 6 [PH] (ref 5–7.5)
PLATELET # BLD AUTO: 240 X10E3/UL (ref 150–450)
POTASSIUM SERPL-SCNC: 4.2 MMOL/L (ref 3.5–5.2)
PROT SERPL-MCNC: 7.3 G/DL (ref 6–8.5)
PROT UR QL STRIP: NEGATIVE
RBC # BLD AUTO: 5.13 X10E6/UL (ref 3.77–5.28)
SODIUM SERPL-SCNC: 140 MMOL/L (ref 134–144)
SP GR UR: 1 (ref 1–1.03)
TRIGL SERPL-MCNC: 61 MG/DL (ref 0–149)
TSH SERPL DL<=0.005 MIU/L-ACNC: 2.38 UIU/ML (ref 0.45–4.5)
UROBILINOGEN UR STRIP-MCNC: 0.2 MG/DL (ref 0.2–1)
VLDLC SERPL CALC-MCNC: 12 MG/DL (ref 5–40)
WBC # BLD AUTO: 4.3 X10E3/UL (ref 3.4–10.8)

## 2020-01-03 ENCOUNTER — HOSPITAL ENCOUNTER (OUTPATIENT)
Dept: LAB | Age: 70
Discharge: HOME | End: 2020-01-03
Payer: MEDICARE

## 2020-01-03 PROCEDURE — 82270 OCCULT BLOOD FECES: CPT

## 2020-01-07 LAB — HEMOCCULT STL QL IA: NEGATIVE

## 2020-10-23 LAB — MAMMOGRAPHY, EXTERNAL: NORMAL

## 2020-12-22 ENCOUNTER — TELEPHONE (OUTPATIENT)
Dept: FAMILY MEDICINE CLINIC | Age: 70
End: 2020-12-22

## 2020-12-22 ENCOUNTER — OFFICE VISIT (OUTPATIENT)
Dept: FAMILY MEDICINE CLINIC | Age: 70
End: 2020-12-22
Payer: MEDICARE

## 2020-12-22 VITALS
WEIGHT: 228 LBS | HEIGHT: 66 IN | HEART RATE: 64 BPM | TEMPERATURE: 98.2 F | DIASTOLIC BLOOD PRESSURE: 80 MMHG | OXYGEN SATURATION: 99 % | BODY MASS INDEX: 36.64 KG/M2 | SYSTOLIC BLOOD PRESSURE: 140 MMHG | RESPIRATION RATE: 18 BRPM

## 2020-12-22 DIAGNOSIS — Z00.00 MEDICARE ANNUAL WELLNESS VISIT, SUBSEQUENT: Primary | ICD-10-CM

## 2020-12-22 DIAGNOSIS — I10 BENIGN ESSENTIAL HYPERTENSION: ICD-10-CM

## 2020-12-22 DIAGNOSIS — E66.01 SEVERE OBESITY (HCC): ICD-10-CM

## 2020-12-22 DIAGNOSIS — N18.31 STAGE 3A CHRONIC KIDNEY DISEASE (HCC): ICD-10-CM

## 2020-12-22 LAB
ALBUMIN SERPL-MCNC: 3.9 G/DL (ref 3.5–5)
ALBUMIN/GLOB SERPL: 1 {RATIO} (ref 1.1–2.2)
ALP SERPL-CCNC: 89 U/L (ref 45–117)
ALT SERPL-CCNC: 28 U/L (ref 12–78)
ANION GAP SERPL CALC-SCNC: 3 MMOL/L (ref 5–15)
APPEARANCE UR: CLEAR
AST SERPL-CCNC: 18 U/L (ref 15–37)
BACTERIA URNS QL MICRO: NEGATIVE /HPF
BILIRUB SERPL-MCNC: 0.8 MG/DL (ref 0.2–1)
BILIRUB UR QL: NEGATIVE
BUN SERPL-MCNC: 20 MG/DL (ref 6–20)
BUN/CREAT SERPL: 18 (ref 12–20)
CALCIUM SERPL-MCNC: 9.3 MG/DL (ref 8.5–10.1)
CHLORIDE SERPL-SCNC: 106 MMOL/L (ref 97–108)
CHOLEST SERPL-MCNC: 164 MG/DL
CO2 SERPL-SCNC: 29 MMOL/L (ref 21–32)
COLOR UR: NORMAL
CREAT SERPL-MCNC: 1.14 MG/DL (ref 0.55–1.02)
EPITH CASTS URNS QL MICRO: NORMAL /LPF
ERYTHROCYTE [DISTWIDTH] IN BLOOD BY AUTOMATED COUNT: 13.5 % (ref 11.5–14.5)
GLOBULIN SER CALC-MCNC: 3.9 G/DL (ref 2–4)
GLUCOSE SERPL-MCNC: 84 MG/DL (ref 65–100)
GLUCOSE UR STRIP.AUTO-MCNC: NEGATIVE MG/DL
HCT VFR BLD AUTO: 43 % (ref 35–47)
HDLC SERPL-MCNC: 64 MG/DL
HDLC SERPL: 2.6 {RATIO} (ref 0–5)
HGB BLD-MCNC: 13.7 G/DL (ref 11.5–16)
HGB UR QL STRIP: NEGATIVE
HYALINE CASTS URNS QL MICRO: NORMAL /LPF (ref 0–5)
KETONES UR QL STRIP.AUTO: NEGATIVE MG/DL
LDLC SERPL CALC-MCNC: 85.8 MG/DL (ref 0–100)
LEUKOCYTE ESTERASE UR QL STRIP.AUTO: NEGATIVE
LIPID PROFILE,FLP: NORMAL
MCH RBC QN AUTO: 27.5 PG (ref 26–34)
MCHC RBC AUTO-ENTMCNC: 31.9 G/DL (ref 30–36.5)
MCV RBC AUTO: 86.3 FL (ref 80–99)
NITRITE UR QL STRIP.AUTO: NEGATIVE
NRBC # BLD: 0 K/UL (ref 0–0.01)
NRBC BLD-RTO: 0 PER 100 WBC
PH UR STRIP: 5.5 [PH] (ref 5–8)
PLATELET # BLD AUTO: 217 K/UL (ref 150–400)
PMV BLD AUTO: 12 FL (ref 8.9–12.9)
POTASSIUM SERPL-SCNC: 4.4 MMOL/L (ref 3.5–5.1)
PROT SERPL-MCNC: 7.8 G/DL (ref 6.4–8.2)
PROT UR STRIP-MCNC: NEGATIVE MG/DL
RBC # BLD AUTO: 4.98 M/UL (ref 3.8–5.2)
RBC #/AREA URNS HPF: NORMAL /HPF (ref 0–5)
SODIUM SERPL-SCNC: 138 MMOL/L (ref 136–145)
SP GR UR REFRACTOMETRY: 1.01 (ref 1–1.03)
TRIGL SERPL-MCNC: 71 MG/DL (ref ?–150)
TSH SERPL DL<=0.05 MIU/L-ACNC: 2.4 UIU/ML (ref 0.36–3.74)
UA: UC IF INDICATED,UAUC: NORMAL
UROBILINOGEN UR QL STRIP.AUTO: 0.2 EU/DL (ref 0.2–1)
VLDLC SERPL CALC-MCNC: 14.2 MG/DL
WBC # BLD AUTO: 4.3 K/UL (ref 3.6–11)
WBC URNS QL MICRO: NORMAL /HPF (ref 0–4)

## 2020-12-22 PROCEDURE — 1090F PRES/ABSN URINE INCON ASSESS: CPT | Performed by: FAMILY MEDICINE

## 2020-12-22 PROCEDURE — G8427 DOCREV CUR MEDS BY ELIG CLIN: HCPCS | Performed by: FAMILY MEDICINE

## 2020-12-22 PROCEDURE — G8399 PT W/DXA RESULTS DOCUMENT: HCPCS | Performed by: FAMILY MEDICINE

## 2020-12-22 PROCEDURE — G8417 CALC BMI ABV UP PARAM F/U: HCPCS | Performed by: FAMILY MEDICINE

## 2020-12-22 PROCEDURE — G8536 NO DOC ELDER MAL SCRN: HCPCS | Performed by: FAMILY MEDICINE

## 2020-12-22 PROCEDURE — 1101F PT FALLS ASSESS-DOCD LE1/YR: CPT | Performed by: FAMILY MEDICINE

## 2020-12-22 PROCEDURE — G8754 DIAS BP LESS 90: HCPCS | Performed by: FAMILY MEDICINE

## 2020-12-22 PROCEDURE — G0439 PPPS, SUBSEQ VISIT: HCPCS | Performed by: FAMILY MEDICINE

## 2020-12-22 PROCEDURE — G0463 HOSPITAL OUTPT CLINIC VISIT: HCPCS | Performed by: FAMILY MEDICINE

## 2020-12-22 PROCEDURE — 99213 OFFICE O/P EST LOW 20 MIN: CPT | Performed by: FAMILY MEDICINE

## 2020-12-22 PROCEDURE — G9899 SCRN MAM PERF RSLTS DOC: HCPCS | Performed by: FAMILY MEDICINE

## 2020-12-22 PROCEDURE — G8753 SYS BP > OR = 140: HCPCS | Performed by: FAMILY MEDICINE

## 2020-12-22 PROCEDURE — 3017F COLORECTAL CA SCREEN DOC REV: CPT | Performed by: FAMILY MEDICINE

## 2020-12-22 PROCEDURE — G8510 SCR DEP NEG, NO PLAN REQD: HCPCS | Performed by: FAMILY MEDICINE

## 2020-12-22 NOTE — PROGRESS NOTES
Chief Complaint   Patient presents with    Annual Wellness Visit    Hypertension    Chronic Kidney Disease       HISTORY OF PRESENT ILLNESS   HPI  Fasting follow up Hypertension, CKD, and labs. Follows a balanced diet and walks nearly every day x 45-60 minutes for exercise, wts 3 x a week. Weight pretty stable over the past few years. Has not been checking her home BP's for a long time. But she went to [facility] ER 2 weeks ago for acute vertigo and her BP then was 150/90. (Workup there was reportedly negative according to patient including labs, ekg, head ct; she was better after a few days and has not had any recurrence since then)  She does admit she eats a lot of sea salt. Adds it to her snacks. REVIEW OF SYMPTOMS   Review of Systems   Constitutional: Negative. HENT: Negative. Eyes: Negative. Negative for blurred vision. Respiratory: Negative. Cardiovascular: Negative. Gastrointestinal: Negative. Genitourinary: Negative. Musculoskeletal: Negative. Neurological: Negative. Negative for tingling, focal weakness and headaches. Endo/Heme/Allergies: Negative. Psychiatric/Behavioral: Negative.             PROBLEM LIST/MEDICAL HISTORY     Problem List  Date Reviewed: 12/22/2020          Codes Class Noted    Benign essential hypertension ICD-10-CM: I10  ICD-9-CM: 401.1  12/15/2017        CKD (chronic kidney disease) stage 3, GFR 30-59 ml/min ICD-10-CM: N18.30  ICD-9-CM: 585.3  1/2/2017    Overview Addendum 12/15/2017  9:32 AM by Taye Vidal MD     Referred to nephrology 1-2017; Dr Wilmer Gipson, related to chronic HTN             Acute onset of severe vertigo ICD-10-CM: R42  ICD-9-CM: 780.4  12/9/2016    Overview Addendum 12/22/2020 10:04 AM by Taye Vidal MD     9-21-16 [facility] ER: labs, cardiac workup and brain MRI normal; dx w/ inner ear; resolved;  [facility] ER, Head CT, EKG all ok             Submammary Candidal Intertrigo ICD-10-CM: B37.2  ICD-9-CM: 112.3  12/9/2016    Overview Signed 12/9/2016  9:56 AM by Ariela Wilcox MD     Dermatologist in [address] [place] [address], Dr Kathia Medrano             Migraine without aura ICD-10-CM: Q45.836  ICD-9-CM: 346.10  12/9/2016    Overview Signed 12/9/2016  9:57 AM by Ariela Wilcox MD     In her 30's: triggered by cheese, cow's milk, certain sweets/chocolate             Advance care planning ICD-10-CM: Z71.89  ICD-9-CM: V65.49  12/8/2015    Overview Addendum 12/15/2017  9:27 AM by Ariela Wilcox MD     Initial ACP discussion. AMD form reviewed and copy provided. NN asst pt 12-8-15, copy in chart.  ACP note updated              Sleep related leg cramps ICD-10-CM: G47.62  ICD-9-CM: 327.52  12/29/2011    Overview Signed 12/29/2011  8:51 AM by Ariela Wilcox MD     Improved w/ OTC Mg++             Atypical ductal hyperplasia of left breast, 2005 ICD-10-CM: N60.99  ICD-9-CM: 610.8  12/29/2011    Overview Addendum 12/29/2014  9:50 AM by Ariela Wilcox MD     Followed routinely 2 x a year at [facility], Dr Edi Bowens             Lipoma of right breast, 2000 ICD-10-CM: D17.1  ICD-9-CM: 214.8  12/29/2011        Cataract, right eye ICD-10-CM: H26.9  ICD-9-CM: 366.9  12/29/2011    Overview Signed 12/29/2011  9:03 AM by Ariela Wilcox MD     Early-2011             S/P colonoscopy 2009, 2014 ICD-10-CM: G45.576  ICD-9-CM: V45.89  11/9/2010    Overview Addendum 12/29/2014  9:52 AM by Ariela Wilcox MD     [place]; F/U 3 yrs recommended d/t poor study/incomplete; had Barium Enema study instead which was normal; Colonoscopy 3/2014 Dr Lianne Rain, polyp             Retinal hemorrhage, right eye ICD-10-CM: H35.61  ICD-9-CM: 362.81  8/16/2010    Overview Addendum 12/28/2012  9:23 AM by Ariela Wilcox MD     ~2009; Dr Binh Chavez             Fibrocystic breasts ICD-10-CM: N60.19  ICD-9-CM: 610.1  8/16/2010        Postmenopause s/p hysterectomy s/p HRT [id number] ICD-10-CM: Z78.0  ICD-9-CM: V49.81  8/16/2010        H/O Mild Chronic Renal Insufficiency ICD-10-CM: N28.9  ICD-9-CM: 593.9  8/16/2010    Overview Signed 8/16/2010  8:22 PM by Ariela Wilcox MD     2009, [place], Dr Summer Gayle FHx: Breast Cancer ICD-10-CM: Z80.3  ICD-9-CM: V16.3  8/16/2010                  PAST SURGICAL HISTORY     Past Surgical History:   Procedure Laterality Date    HX BREAST BIOPSY  4/2005, Dr Edi Bowens    left breast, atypical ductal hyperplasia; Dr Edi Bowens, [address]    HX CATARACT REMOVAL  08/14/2019    right eye cataract     HX CATARACT REMOVAL  08/28/2019    left eye cataract     HX COLONOSCOPY  3/2014    Colon polyp, Dr Lianne Rain, f/u 3 yrs    HX COLONOSCOPY  04/01/2017    \"normal\", repeat 10 yrs, Dr Marivel Lorenzana, f/u 5 yrs    HX PREMALIG/BENIGN SKIN LESION EXCISION Left 11/14/2017    left lateral knee     HX CYNTHIA AND BSO  2002    AUB, Fibroids    TAYA MAMMOGRAM SCREENING BILATERAL  Routinely [address]    q6months d/t strong FHX & h/o ADH; Dr José Osei PAP, LIQUID BASED, MANUAL SCREEN  2/2010    normal, [place]    ME REPAIR OF HAMMERTOE,ONE  2008    right 5th toe         MEDICATIONS     Current Outpatient Medications   Medication Sig    DANDELION ROOT PO Take  by mouth daily.  GLUCOSAM/CHOND-MSM1/C/YESI/BOR (GLUCOSAMINE-CHOND-MSM COMPLEX PO) Take 1 Tab by mouth daily.  zinc 50 mg tab tablet Take  by mouth daily.  XIANG ROOT (XIANG, ZINGIBER OFFICINALIS,) 550 mg cap Take 550 mg by mouth two (2) times a day.  Amino Acids-Whey Pro Con & Iso (WHEY PROTEIN) 25 gram-140 kcal/34 gram powd Take  by mouth daily.  magnesium 500 mg Tab Take  by mouth nightly.  ascorbic acid (VITAMIN C) 1,000 mg tablet Take 1,000 mg by mouth two (2) times a day.  FLAXSEED OIL PO Take 1 Cap by mouth two (2) times a day.     Cholecalciferol, Vitamin D3, (VITAMIN D) 5,000 unit Tab Take  by mouth daily. Since 2008    multivitamin (ONE A DAY) tablet Take 1 Tab by mouth daily.  omega-3 fatty acids-vitamin e (FISH OIL) 1,000 mg Cap Take  by mouth two (2) times a day.  CALCIUM CARBONATE/VITAMIN D3 (CALCIUM 500 + D, D3, PO) Take  by mouth two (2) times a day. No current facility-administered medications for this visit. ALLERGIES     Allergies   Allergen Reactions    Codeine Itching          SOCIAL HISTORY     Social History     Socioeconomic History    Marital status:      Spouse name: Not on file    Number of children: 3    Years of education: Not on file    Highest education level: Not on file   Occupational History    Occupation: Retired from her job May 2014: formerly at [employer]   Tobacco Use    Smoking status: Never Smoker    Smokeless tobacco: Never Used   Substance and Sexual Activity    Alcohol use: No     Comment: none    Drug use: No    Sexual activity: Not Currently     Comment: not since her divorce in 1996   Other Topics Concern     Service No    Blood Transfusions No    Caffeine Concern No     Comment: 2 cups of decaff herbal tea a day; no sodas or coffee    Occupational Exposure No    Hobby Hazards No    Sleep Concern No    Stress Concern No    Weight Concern No    Special Diet Yes     Comment: drinks lots of water; sensible, low fat, mostly baked, lots of veggies and blended juices, fresh fruits    Back Care No    Exercise Yes     Comment: walks 4-5 miles qd x 1 hr; wts 3 x a week    Bike Helmet No     Comment: does not ride   [address] Yes    Self-Exams Yes   Social History Narrative     since 55; Native of [place]; Prior PCP in [facility], Dr Adan Monterroso in [place], lives alone in her townhouse. Daughter nearby. Does her own ADL's. Drives. Manages her own medications and financial affairs.          Patient completed Advanced Directive , copy in chart. Balanced diet    Walks 5-6 days a week x 45 minutes to an hr    Weights 3 x a week    1 cup of green tea a day, no coffee or sodas                    IMMUNIZATIONS  Immunization History   Administered Date(s) Administered    Hep B Vaccine 2013, 2013, 2013    Influenza High Dose Vaccine PF 2014, 2015, 2016, 10/12/2017, 10/18/2019, 2019    Influenza Vaccine 2012, 2013, 10/23/2020    Influenza Vaccine (Tri) Adjuvanted (>65 Yrs FLUAD TRI 31030) 2018    Pneumococcal Conjugate (PCV-13) 2016    Pneumococcal Polysaccharide (PPSV-23) 2014    Tdap 2019    Zoster Vaccine, Live 2016         FAMILY HISTORY     Family History   Problem Relation Age of Onset    Diabetes Mother     Stroke Mother     Hypertension Mother     High Cholesterol Mother     Hypertension Father     Heart Disease Father     Seizures Maternal Grandmother     Diabetes Maternal Grandfather     Heart Disease Maternal Grandfather     Ovarian Cancer Sister     Breast Cancer Paternal Aunt          in her 42's    Breast Cancer Daughter         dx age 32; my pt; B Mastectomy         VITALS     Visit Vitals  BP (!) 140/80 (BP 1 Location: Left arm, BP Patient Position: Sitting)   Pulse 64   Temp 98.2 °F (36.8 °C) (Temporal)   Resp 18   Ht 5' 6\" (1.676 m)   Wt 228 lb (103.4 kg)   SpO2 99%   BMI 36.80 kg/m²          PHYSICAL EXAMINATION   Physical Exam  Vitals signs reviewed. Constitutional:       General: She is not in acute distress. Appearance: She is obese. HENT:      Right Ear: Tympanic membrane normal.      Left Ear: Tympanic membrane normal.   Eyes:      Conjunctiva/sclera: Conjunctivae normal.   Neck:      Musculoskeletal: Neck supple. Thyroid: No thyroid mass, thyromegaly or thyroid tenderness. Vascular: No carotid bruit. Cardiovascular:      Rate and Rhythm: Normal rate and regular rhythm. Heart sounds: Normal heart sounds. No murmur. No gallop. Pulmonary:      Effort: Pulmonary effort is normal.      Breath sounds: Normal breath sounds. Abdominal:      Palpations: Abdomen is soft. Tenderness: There is no abdominal tenderness. Musculoskeletal:         General: No swelling or tenderness. Right lower leg: No edema. Left lower leg: No edema. Lymphadenopathy:      Cervical: No cervical adenopathy. Skin:     General: Skin is warm and dry. Neurological:      General: No focal deficit present. Mental Status: She is oriented to person, place, and time.    Psychiatric:         Mood and Affect: Mood normal.             DIAGNOSTIC DATA         LABORATORY DATA     Results for orders placed or performed in visit on 12/20/19   OCCULT BLOOD IMMUNOASSAY,DIAGNOSTIC   Result Value Ref Range    Occult blood fecal, by IA Negative Negative   CBC W/O DIFF   Result Value Ref Range    WBC 4.3 3.4 - 10.8 x10E3/uL    RBC 5.13 3.77 - 5.28 x10E6/uL    HGB 14.3 11.1 - 15.9 g/dL    HCT 42.3 34.0 - 46.6 %    MCV 83 79 - 97 fL    MCH 27.9 26.6 - 33.0 pg    MCHC 33.8 31.5 - 35.7 g/dL    RDW 12.8 12.3 - 15.4 %    PLATELET 657 851 - 637 x10E3/uL   LIPID PANEL   Result Value Ref Range    Cholesterol, total 144 100 - 199 mg/dL    Triglyceride 61 0 - 149 mg/dL    HDL Cholesterol 55 >39 mg/dL    VLDL, calculated 12 5 - 40 mg/dL    LDL, calculated 77 0 - 99 mg/dL   METABOLIC PANEL, COMPREHENSIVE   Result Value Ref Range    Glucose 83 65 - 99 mg/dL    BUN 14 8 - 27 mg/dL    Creatinine 1.23 (H) 0.57 - 1.00 mg/dL    GFR est non-AA 45 (L) >59 mL/min/1.73    GFR est AA 51 (L) >59 mL/min/1.73    BUN/Creatinine ratio 11 (L) 12 - 28    Sodium 140 134 - 144 mmol/L    Potassium 4.2 3.5 - 5.2 mmol/L    Chloride 102 96 - 106 mmol/L    CO2 22 20 - 29 mmol/L    Calcium 9.6 8.7 - 10.3 mg/dL    Protein, total 7.3 6.0 - 8.5 g/dL    Albumin 4.3 3.5 - 4.8 g/dL    GLOBULIN, TOTAL 3.0 1.5 - 4.5 g/dL    A-G Ratio 1.4 1.2 - 2.2 Bilirubin, total 0.9 0.0 - 1.2 mg/dL    Alk. phosphatase 83 39 - 117 IU/L    AST (SGOT) 22 0 - 40 IU/L    ALT (SGPT) 20 0 - 32 IU/L   TSH 3RD GENERATION   Result Value Ref Range    TSH 2.380 0.450 - 4.500 uIU/mL   URINALYSIS W/ RFLX MICROSCOPIC   Result Value Ref Range    Specific Gravity 1.005 1.005 - 1.030    pH (UA) 6.0 5.0 - 7.5    Color Yellow Yellow    Appearance Clear Clear    Leukocyte Esterase Negative Negative    Protein Negative Negative/Trace    Glucose Negative Negative    Ketone Negative Negative    Blood Negative Negative    Bilirubin Negative Negative    Urobilinogen 0.2 0.2 - 1.0 mg/dL    Nitrites Negative Negative    Microscopic Examination Comment    CKD REPORT   Result Value Ref Range    Interpretation Note    CVD REPORT   Result Value Ref Range    INTERPRETATION Note     PDF IMAGE Not applicable             ASSESSMENT & PLAN       ICD-10-CM ICD-9-CM    1. Medicare annual wellness visit, subsequent  Z00.00 V70.0 See separate note under this same encounter visit for Medicare Wellness note. 2. Benign essential hypertension  Z47 571.8 METABOLIC PANEL, COMPREHENSIVE      LIPID PANEL      TSH 3RD GENERATION   3. Stage 3a chronic kidney disease  N18.31 585.3 CBC W/O DIFF      METABOLIC PANEL, COMPREHENSIVE      URINALYSIS W/ REFLEX CULTURE   4. BMI 36.0-36.9,adult  Z68.36 V85.36 LIPID PANEL      TSH 3RD GENERATION     Fasting labs today  Cardiovascular risk and specific BP/lipid/LDL goals reviewed  She refuses BP medication but will work on reducing her sodium intake  Low sodium, DASH diet recommended, handouts given  Continue her regular cardio exercise  Reviewed diet, nutrition, exercise, weight management, BMI/goals. Age/risk based screening recommendations, health maintenance & prevention counseling. Cancer screening USPTFS guidelines reviewed w/ pt today. Discussed benefits/positive/negative outcomes of screening based on age/risk stratification.  Informed consent for/against screening based on pt's personal hx/risk factors. Updated in history above and health maintenance. M[facility] done at [facility] 10[facility]23[facility] per her breast surgeon Dr. Wicho Menjivar.  Will request report  Monitor interim BP's and update me w/ readings over the next several weeks  Further follow up & other recommendations pending review of labs as well

## 2020-12-22 NOTE — PATIENT INSTRUCTIONS
Medicare Wellness Visit, Female The best way to live healthy is to have a lifestyle where you eat a well-balanced diet, exercise regularly, limit alcohol use, and quit all forms of tobacco/nicotine, if applicable. Regular preventive services are another way to keep healthy. Preventive services (vaccines, screening tests, monitoring & exams) can help personalize your care plan, which helps you manage your own care. Screening tests can find health problems at the earliest stages, when they are easiest to treat. [facility] follows the current, evidence-based guidelines published by the [facility] ([facility]) when recommending preventive services for our patients. Because we follow these guidelines, sometimes recommendations change over time as research supports it. (For example, mammograms used to be recommended annually. Even though Medicare will still pay for an annual mammogram, the newer guidelines recommend a mammogram every two years for women of average risk). Of course, you and your doctor may decide to screen more often for some diseases, based on your risk and your co-morbidities (chronic disease you are already diagnosed with). Preventive services for you include: - Medicare offers their members a free annual wellness visit, which is time for you and your primary care provider to discuss and plan for your preventive service needs. Take advantage of this benefit every year! 
-All adults over the age of 72 should receive the recommended pneumonia vaccines. Current USPSTF guidelines recommend a series of two vaccines for the best pneumonia protection.  
-All adults should have a flu vaccine yearly and a tetanus vaccine every 10 years.  
-All adults age 48 and older should receive the shingles vaccines (series of two vaccines). -All adults age 38-68 who are overweight should have a diabetes screening test once every three years. -All adults born between [birth year] and 1966 should be screened once for Hepatitis C. 
-Other screening tests and preventive services for persons with diabetes include: an eye exam to screen for diabetic retinopathy, a kidney function test, a foot exam, and stricter control over your cholesterol.  
-Cardiovascular screening for adults with routine risk involves an electrocardiogram (ECG) at intervals determined by your doctor.  
-Colorectal cancer screenings should be done for adults age 54-65 with no increased risk factors for colorectal cancer. There are a number of acceptable methods of screening for this type of cancer. Each test has its own benefits and drawbacks. Discuss with your doctor what is most appropriate for you during your annual wellness visit. The different tests include: colonoscopy (considered the best screening method), a fecal occult blood test, a fecal DNA test, and sigmoidoscopy. 
 
-A bone mass density test is recommended when a woman turns 65 to screen for osteoporosis. This test is only recommended one time, as a screening. Some providers will use this same test as a disease monitoring tool if you already have osteoporosis. -Breast cancer screenings are recommended every other year for women of normal risk, age 54-69. 
-Cervical cancer screenings for women over age 72 are only recommended with certain risk factors. Here is a list of your current Health Maintenance items (your personalized list of preventive services) with a due date: 
Health Maintenance Due Topic Date Due  Shingles Vaccine (1 of 2) 11/20/1999  Mammogram  11/01/2020 Low Sodium Diet (2,000 Milligram): Care Instructions Your Care Instructions Too much sodium causes your body to hold on to extra water. This can raise your blood pressure and force your heart and kidneys to work harder. In very serious cases, this could cause you to be put in the hospital. It might even be life-threatening. By limiting sodium, you will feel better and lower your risk of serious problems. The most common source of sodium is salt. People get most of the salt in their diet from canned, prepared, and packaged foods. Fast food and restaurant meals also are very high in sodium. Your doctor will probably limit your sodium to less than 2,000 milligrams (mg) a day. This limit counts all the sodium in prepared and packaged foods and any salt you add to your food. Follow-up care is a key part of your treatment and safety. Be sure to make and go to all appointments, and call your doctor if you are having problems. It's also a good idea to know your test results and keep a list of the medicines you take. How can you care for yourself at home? Read food labels · Read labels on cans and food packages. The labels tell you how much sodium is in each serving. Make sure that you look at the serving size. If you eat more than the serving size, you have eaten more sodium. · Food labels also tell you the Percent Daily Value for sodium. Choose products with low Percent Daily Values for sodium. · Be aware that sodium can come in forms other than salt, including monosodium glutamate (MSG), sodium citrate, and sodium bicarbonate (baking soda). MSG is often added to Asian food. When you eat out, you can sometimes ask for food without MSG or added salt. Buy low-sodium foods · Buy foods that are labeled \"unsalted\" (no salt added), \"sodium-free\" (less than 5 mg of sodium per serving), or \"low-sodium\" (less than 140 mg of sodium per serving). Foods labeled \"reduced-sodium\" and \"light sodium\" may still have too much sodium. Be sure to read the label to see how much sodium you are getting. · Buy fresh vegetables, or frozen vegetables without added sauces. Buy low-sodium versions of canned vegetables, soups, and other canned goods. Prepare low-sodium meals · Cut back on the amount of salt you use in cooking. This will help you adjust to the taste. Do not add salt after cooking. One teaspoon of salt has about 2,300 mg of sodium. · Take the salt shaker off the table. · Flavor your food with garlic, lemon juice, onion, vinegar, herbs, and spices. Do not use soy sauce, lite soy sauce, steak sauce, onion salt, garlic salt, celery salt, mustard, or ketchup on your food. · Use low-sodium salad dressings, sauces, and ketchup. Or make your own salad dressings and sauces without adding salt. · Use less salt (or none) when recipes call for it. You can often use half the salt a recipe calls for without losing flavor. Other foods such as rice, pasta, and grains do not need added salt. · Rinse canned vegetables, and cook them in fresh water. This removes somebut not allof the salt. · Avoid water that is naturally high in sodium or that has been treated with water softeners, which add sodium. Call your local water company to find out the sodium content of your water supply. If you buy bottled water, read the label and choose a sodium-free brand. Avoid high-sodium foods · Avoid eating: 
? Smoked, cured, salted, and canned meat, fish, and poultry. ? Ham, harrison, hot dogs, and luncheon meats. ? Regular, hard, and processed cheese and regular peanut butter. ? Crackers with salted tops, and other salted snack foods such as pretzels, chips, and salted popcorn. ? Frozen prepared meals, unless labeled low-sodium. ? Canned and dried soups, broths, and bouillon, unless labeled sodium-free or low-sodium. ? Canned vegetables, unless labeled sodium-free or low-sodium. ? Western Tammi fries, pizza, tacos, and other fast foods. ? Pickles, olives, ketchup, and other condiments, especially soy sauce, unless labeled sodium-free or low-sodium. Where can you learn more? Go to http://www.gray.com/ Enter G652 in the search box to learn more about \"Low Sodium Diet (2,000 Milligram): Care Instructions. \" Current as of: August 22, 2019               Content Version: 12.6 © 7115-5732 [employer]. Care instructions adapted under license by [employer] (which disclaims liability or warranty for this information). If you have questions about a medical condition or this instruction, always ask your healthcare professional. [address] any warranty or liability for your use of this information. DASH Diet: Care Instructions Your Care Instructions The DASH diet is an eating plan that can help lower your blood pressure. DASH stands for Dietary Approaches to Stop Hypertension. Hypertension is high blood pressure. The DASH diet focuses on eating foods that are high in calcium, potassium, and magnesium. These nutrients can lower blood pressure. The foods that are highest in these nutrients are fruits, vegetables, low-fat dairy products, nuts, seeds, and legumes. But taking calcium, potassium, and magnesium supplements instead of eating foods that are high in those nutrients does not have the same effect. The DASH diet also includes whole grains, fish, and poultry. The DASH diet is one of several lifestyle changes your doctor may recommend to lower your high blood pressure. Your doctor may also want you to decrease the amount of sodium in your diet. Lowering sodium while following the DASH diet can lower blood pressure even further than just the DASH diet alone. Follow-up care is a key part of your treatment and safety. Be sure to make and go to all appointments, and call your doctor if you are having problems. It's also a good idea to know your test results and keep a list of the medicines you take. How can you care for yourself at home? Following the DASH diet · Eat 4 to 5 servings of fruit each day. A serving is 1 medium-sized piece of fruit, ½ cup chopped or canned fruit, 1/4 cup dried fruit, or 4 ounces (½ cup) of fruit juice. Choose fruit more often than fruit juice. · Eat 4 to 5 servings of vegetables each day. A serving is 1 cup of lettuce or raw leafy vegetables, ½ cup of chopped or cooked vegetables, or 4 ounces (½ cup) of vegetable juice. Choose vegetables more often than vegetable juice. · Get 2 to 3 servings of low-fat and fat-free dairy each day. A serving is 8 ounces of milk, 1 cup of yogurt, or 1 ½ ounces of cheese. · Eat 6 to 8 servings of grains each day. A serving is 1 slice of bread, 1 ounce of dry cereal, or ½ cup of cooked rice, pasta, or cooked cereal. Try to choose whole-grain products as much as possible. · Limit lean meat, poultry, and fish to 2 servings each day. A serving is 3 ounces, about the size of a deck of cards. · Eat 4 to 5 servings of nuts, seeds, and legumes (cooked dried beans, lentils, and split peas) each week. A serving is 1/3 cup of nuts, 2 tablespoons of seeds, or ½ cup of cooked beans or peas. · Limit fats and oils to 2 to 3 servings each day. A serving is 1 teaspoon of vegetable oil or 2 tablespoons of salad dressing. · Limit sweets and added sugars to 5 servings or less a week. A serving is 1 tablespoon jelly or jam, ½ cup sorbet, or 1 cup of lemonade. · Eat less than 2,300 milligrams (mg) of sodium a day. If you limit your sodium to 1,500 mg a day, you can lower your blood pressure even more. Tips for success · Start small. Do not try to make dramatic changes to your diet all at once. You might feel that you are missing out on your favorite foods and then be more likely to not follow the plan. Make small changes, and stick with them. Once those changes become habit, add a few more changes. · Try some of the following: ? Make it a goal to eat a fruit or vegetable at every meal and at snacks. This will make it easy to get the recommended amount of fruits and vegetables each day. ? Try yogurt topped with fruit and nuts for a snack or healthy dessert. ? Add lettuce, tomato, cucumber, and onion to sandwiches. ? Combine a ready-made pizza crust with low-fat mozzarella cheese and lots of vegetable toppings. Try using tomatoes, squash, spinach, broccoli, carrots, cauliflower, and onions. ? Have a variety of cut-up vegetables with a low-fat dip as an appetizer instead of chips and dip. ? Sprinkle sunflower seeds or chopped almonds over salads. Or try adding chopped walnuts or almonds to cooked vegetables. ? Try some vegetarian meals using beans and peas. Add garbanzo or kidney beans to salads. Make burritos and tacos with mashed peralta beans or black beans. Where can you learn more? Go to http://www.Bloom [employer]/ Enter E522 in the search box to learn more about \"DASH Diet: Care Instructions. \" Current as of: December 16, 2019               Content Version: 12.6 © 1414-3432 [employer]. Care instructions adapted under license by [employer] (which disclaims liability or warranty for this information). If you have questions about a medical condition or this instruction, always ask your healthcare professional. [address] any warranty or liability for your use of this information.

## 2020-12-22 NOTE — TELEPHONE ENCOUNTER
Please request Mammogram from [facility] Dr. Kathleen Martinez. Patient states done .

## 2020-12-22 NOTE — PROGRESS NOTES
This is the Subsequent Medicare Annual Wellness Exam, performed 12 months or more after the Initial AWV or the last Subsequent AWV    I have reviewed the patient's medical history in detail and updated the computerized patient record. Depression Risk Factor Screening:     3 most recent PHQ Screens 12/22/2020   Little interest or pleasure in doing things Not at all   Feeling down, depressed, irritable, or hopeless Not at all   Total Score PHQ 2 0       Alcohol Risk Screen    Do you average more than 1 drink per night or more than 7 drinks a week:  No    On any one occasion in the past three months have you have had more than 3 drinks containing alcohol:  No        Functional Ability and Level of Safety:    Hearing: Hearing is good. Activities of Daily Living: The home contains: no safety equipment. Patient does total self care      Ambulation: with no difficulty     Fall Risk:  Fall Risk Assessment, last 12 mths 12/22/2020   Able to walk? Yes   Fall in past 12 months? No      Abuse Screen:  Patient is not abused       Cognitive Screening    Has your family/caregiver stated any concerns about your memory: no      Assessment/Plan   Education and counseling provided:  Are appropriate based on today's review and evaluation    Diagnoses and all orders for this visit:    1.  Medicare annual wellness visit, subsequent        Health Maintenance Due     Health Maintenance Due   Topic Date Due    Shingrix Vaccine Age 49> (1 of 2) 11/20/1999    Breast Cancer Screen Mammogram, done at [facility] , will request 11/01/2020       Patient Care Team   Patient Care Team:  Stanley Love MD as PCP - General  Stanley Love MD as PCP - [facility] Empaneled Provider    History     Patient Active Problem List   Diagnosis Code    Retinal hemorrhage, right eye H35.61    Fibrocystic breasts N60.19    Postmenopause s/p hysterectomy s/p HRT [id number] Z78.0    H/O Mild Chronic Renal Insufficiency N28.9    Strong FHx: Breast Cancer Z80.3    S/P colonoscopy 2009, 2014 Z98.890    Sleep related leg cramps G47.62    Atypical ductal hyperplasia of left breast, 2005 N60.99    Lipoma of right breast, 2000 D17.1    Cataract, right eye H26.9    Advance care planning Z71.89    Acute onset of severe vertigo R42    Submammary Candidal Intertrigo B37.2    Migraine without aura G43.009    CKD (chronic kidney disease) stage 3, GFR 30-59 ml/min N18.30    Benign essential hypertension I10     Past Medical History:   Diagnosis Date    Acute onset of severe vertigo 12/9/2016 9-21-16 [facility]: labs, cardiac workup and brain MRI normal; dx w/ inner ear; resolved    Advance care planning 12/8/2015    Advance care planning 12/8/2015    Initial ACP discussion. AMD form reviewed and copy provided. NN to d/w pt 12-8-15    Atypical ductal hyperplasia of left breast, 2005 12/29/2011    Benign essential hypertension 12/15/2017    Cataract, right eye 12/29/2011    CKD (chronic kidney disease) stage 3, GFR 30-59 ml/min 1/2/2017    Referred to nephrology 1-2017    Fibrocystic breasts 8/16/2010    H/O Chronic Renal Insufficiency 8/16/2010    Lipoma of right breast, 2000 12/29/2011    Migraine without aura 12/9/2016    In her 30's: triggered by cheese, cow's milk, certain sweets/chocolate    Postmenopause s/p hysterectomy s/p HRT [id number] 8/16/2010    Retinal hemorrhage, right eye 8/16/2010    S/P colonoscopy 2009, 2014 11/9/2010    [place]; F/U 3 yrs recommended d/t poor study/incomplete; had Barium Enema study instead which was normal; Colonoscopy 3/2014 Dr Keshav Knight, polyp    Sleep related leg cramps 12/29/2011    Strong FHx: Breast Cancer 8/16/2010    Submammary Candidal Intertrigo 12/9/2016    Dermatologist in [address] [place] [address], Dr Solo Ripple    Vertigo 09/2016      Past Surgical History:   Procedure Laterality Date    HX BREAST BIOPSY  4/2005, Dr Lakeshia Treviño    left breast, atypical ductal hyperplasia;   [place], [facility]    HX CATARACT REMOVAL  08/14/2019    right eye cataract     HX CATARACT REMOVAL  08/28/2019    left eye cataract     HX COLONOSCOPY  3/2014    Colon polyp, Dr Ariana Roy, f/u 3 yrs    HX COLONOSCOPY  04/01/2017    \"normal\", repeat 10 yrs, Dr Franci Gordon, f/u 5 yrs    HX PREMALIG/BENIGN SKIN LESION EXCISION Left 11/14/2017    left lateral knee     HX CYNTHIA AND BSO  2002    AUB, Fibroids    TAYA MAMMOGRAM SCREENING BILATERAL  [address]    q6months d/t strong FHX & h/o ADH; Dr Namrata Jade PAP, LIQUID BASED, MANUAL SCREEN  2/2010    normal, [place]    SD REPAIR OF HAMMERTOE,ONE  2008    right 5th toe     Current Outpatient Medications   Medication Sig Dispense Refill    DANDELION ROOT PO Take  by mouth daily.  GLUCOSAM/CHOND-MSM1/C/YESI/BOR (GLUCOSAMINE-CHOND-MSM COMPLEX PO) Take 1 Tab by mouth daily.  zinc 50 mg tab tablet Take  by mouth daily.  XIANG ROOT (XIANG, ZINGIBER OFFICINALIS,) 550 mg cap Take 550 mg by mouth two (2) times a day.  Amino Acids-Whey Pro Con & Iso (WHEY PROTEIN) 25 gram-140 kcal/34 gram powd Take  by mouth daily.  magnesium 500 mg Tab Take  by mouth nightly.  ascorbic acid (VITAMIN C) 1,000 mg tablet Take 1,000 mg by mouth two (2) times a day.  FLAXSEED OIL PO Take 1 Cap by mouth two (2) times a day.  Cholecalciferol, Vitamin D3, (VITAMIN D) 5,000 unit Tab Take  by mouth daily. Since 2008      multivitamin (ONE A DAY) tablet Take 1 Tab by mouth daily.  omega-3 fatty acids-vitamin e (FISH OIL) 1,000 mg Cap Take  by mouth two (2) times a day.  CALCIUM CARBONATE/VITAMIN D3 (CALCIUM 500 + D, D3, PO) Take  by mouth two (2) times a day.        Allergies   Allergen Reactions    Codeine Itching       Family History   Problem Relation Age of Onset    Diabetes Mother     Stroke Mother     Hypertension Mother     High Cholesterol Mother     Hypertension Father     Heart Disease Father     Seizures Maternal Grandmother     Diabetes Maternal Grandfather     Heart Disease Maternal Grandfather     Ovarian Cancer Sister     Breast Cancer Paternal Aunt          in her 39's    Breast Cancer Daughter         dx age 32; my pt; B Mastectomy     Social History     Tobacco Use    Smoking status: Never Smoker    Smokeless tobacco: Never Used   Substance Use Topics    Alcohol use: No     Comment: none

## 2020-12-22 NOTE — PROGRESS NOTES
Chief Complaint   Patient presents with   Jhonny Filter Annual Wellness Visit        1. Have you been to the ER, urgent care clinic since your last visit? Hospitalized since your last visit? Yes Where: [facility], Motion sickness 12/2020    2. Have you seen or consulted any other health care providers outside of the [address] [place] [address] [place] since your last visit? Include any pap smears or colon screening. No    3 most recent PHQ Screens 12/22/2020   Little interest or pleasure in doing things Not at all   Feeling down, depressed, irritable, or hopeless Not at all   Total Score PHQ 2 0       Fall Risk Assessment, last 12 mths 12/22/2020   Able to walk? Yes   Fall in past 12 months? No               ADL Assessment 12/22/2020   Feeding yourself No Help Needed   Getting from bed to chair No Help Needed   Getting dressed No Help Needed   Bathing or showering No Help Needed   Walk across the room (includes cane/walker) No Help Needed   Using the telphone No Help Needed   Taking your medications No Help Needed   Preparing meals No Help Needed   Managing money (expenses/bills) No Help Needed   Moderately strenuous housework (laundry) No Help Needed   Shopping for personal items (toiletries/medicines) No Help Needed   Shopping for groceries No Help Needed   Driving No Help Needed   Climbing a flight of stairs No Help Needed   Getting to places beyond walking distances No Help Needed         Abuse Screening Questionnaire 12/22/2020   Do you ever feel afraid of your partner? N   Are you in a relationship with someone who physically or mentally threatens you? N   Is it safe for you to go home?  Riya Jordan

## 2021-11-01 LAB — MAMMOGRAPHY, EXTERNAL: NORMAL

## 2022-02-01 ENCOUNTER — OFFICE VISIT (OUTPATIENT)
Dept: FAMILY MEDICINE CLINIC | Age: 72
End: 2022-02-01
Payer: MEDICARE

## 2022-02-01 VITALS
TEMPERATURE: 98.1 F | SYSTOLIC BLOOD PRESSURE: 136 MMHG | BODY MASS INDEX: 33.5 KG/M2 | WEIGHT: 226.2 LBS | OXYGEN SATURATION: 97 % | HEIGHT: 69 IN | DIASTOLIC BLOOD PRESSURE: 82 MMHG | RESPIRATION RATE: 16 BRPM | HEART RATE: 65 BPM

## 2022-02-01 DIAGNOSIS — I10 BENIGN ESSENTIAL HYPERTENSION: ICD-10-CM

## 2022-02-01 DIAGNOSIS — Z00.00 MEDICARE ANNUAL WELLNESS VISIT, SUBSEQUENT: Primary | ICD-10-CM

## 2022-02-01 DIAGNOSIS — N18.31 STAGE 3A CHRONIC KIDNEY DISEASE (HCC): ICD-10-CM

## 2022-02-01 DIAGNOSIS — Z83.3 FAMILY HISTORY OF DIABETES MELLITUS TYPE II: ICD-10-CM

## 2022-02-01 PROCEDURE — G8399 PT W/DXA RESULTS DOCUMENT: HCPCS | Performed by: FAMILY MEDICINE

## 2022-02-01 PROCEDURE — 99214 OFFICE O/P EST MOD 30 MIN: CPT | Performed by: FAMILY MEDICINE

## 2022-02-01 PROCEDURE — G8417 CALC BMI ABV UP PARAM F/U: HCPCS | Performed by: FAMILY MEDICINE

## 2022-02-01 PROCEDURE — G8536 NO DOC ELDER MAL SCRN: HCPCS | Performed by: FAMILY MEDICINE

## 2022-02-01 PROCEDURE — G0439 PPPS, SUBSEQ VISIT: HCPCS | Performed by: FAMILY MEDICINE

## 2022-02-01 PROCEDURE — G9899 SCRN MAM PERF RSLTS DOC: HCPCS | Performed by: FAMILY MEDICINE

## 2022-02-01 PROCEDURE — G8427 DOCREV CUR MEDS BY ELIG CLIN: HCPCS | Performed by: FAMILY MEDICINE

## 2022-02-01 PROCEDURE — G0463 HOSPITAL OUTPT CLINIC VISIT: HCPCS | Performed by: FAMILY MEDICINE

## 2022-02-01 PROCEDURE — 1090F PRES/ABSN URINE INCON ASSESS: CPT | Performed by: FAMILY MEDICINE

## 2022-02-01 PROCEDURE — G8754 DIAS BP LESS 90: HCPCS | Performed by: FAMILY MEDICINE

## 2022-02-01 PROCEDURE — 3017F COLORECTAL CA SCREEN DOC REV: CPT | Performed by: FAMILY MEDICINE

## 2022-02-01 PROCEDURE — G8752 SYS BP LESS 140: HCPCS | Performed by: FAMILY MEDICINE

## 2022-02-01 PROCEDURE — 1101F PT FALLS ASSESS-DOCD LE1/YR: CPT | Performed by: FAMILY MEDICINE

## 2022-02-01 PROCEDURE — G8510 SCR DEP NEG, NO PLAN REQD: HCPCS | Performed by: FAMILY MEDICINE

## 2022-02-01 NOTE — PROGRESS NOTES
Chief Complaint   Patient presents with    Annual Wellness Visit     fasting    Hypertension     1. \"Have you been to the ER, urgent care clinic since your last visit? Hospitalized since your last visit? \" No    2. \"Have you seen or consulted any other health care providers outside of the [address] [place] [address] since your last visit? \" No     3. For patients over 45: Has the patient had a colonoscopy? April 2017 repeat 10 years    If the patient is female:    4. For patients over 40: Has the patient had a mammogram? Oct 2021 [facility]    5. For patients over 21: Has the patient had a pap smear?  NA

## 2022-02-01 NOTE — PROGRESS NOTES
Chief Complaint   Patient presents with    Annual Wellness Visit    Hypertension    Labs     Fasting       HISTORY OF PRESENT ILLNESS   HPI  Fasting follow up Hypertension, CKD3, Labs. Overall getting along well and feeling well in general.  She takes no antihypertensive medications and has preferred to control her BP over the years w/ diet/exercise. Diet: healthy, balanced diet, lots of veggies, salads, seldom any fried foods; loves bread; drinks a lot of water  Caffeine: 2 cups of decaff herbal green tea qpm, no coffee or sodas  Exercise: aerobics video 4 x a week x 45-60 minutes, light weights 2-3 x a week, walks in nicer weather almost every day   REVIEW OF SYMPTOMS   Review of Systems   Constitutional: Negative. HENT: Negative. Eyes: Negative. Respiratory: Negative. Cardiovascular: Negative. Gastrointestinal: Negative. Genitourinary: Negative. Musculoskeletal: Negative. Neurological: Negative. Endo/Heme/Allergies: Negative. Psychiatric/Behavioral: Negative.             PROBLEM LIST/MEDICAL HISTORY     Problem List  Date Reviewed: 2/1/2022          Codes Class Noted    Severe obesity ([facility] [address] ICD-10-CM: E66.01  ICD-9-CM: 278.01  12/22/2020        Benign essential hypertension ICD-10-CM: I10  ICD-9-CM: 401.1  12/15/2017        CKD (chronic kidney disease) stage 3, GFR 30-59 ml/min ([facility]) ICD-10-CM: N18.30  ICD-9-CM: 585.3  1/2/2017    Overview Addendum 12/15/2017  9:32 AM by Carly Gipson MD     Referred to nephrology 1-2017; Dr Duff, related to chronic HTN             Acute onset of severe vertigo ICD-10-CM: R42  ICD-9-CM: 780.4  12/9/2016    Overview Addendum 12/22/2020 10:04 AM by Carly Gipson MD     9-21-16 [facility]: labs, cardiac workup and brain MRI normal; dx w/ inner ear; resolved;  [facility], Head CT, EKG all ok             Submammary Candidal Intertrigo ICD-10-CM: B37.2  ICD-9-CM: 112.3  12/9/2016    Overview Signed 12/9/2016  9:56 AM by Shmuel Castro MD     Dermatologist in [address] [place] [address], Dr Vipul Rothman             Migraine without aura ICD-10-CM: K12.083  ICD-9-CM: 346.10  12/9/2016    Overview Signed 12/9/2016  9:57 AM by Shmuel Castro MD     In her 30's: triggered by cheese, cow's milk, certain sweets/chocolate             Advance care planning ICD-10-CM: Z71.89  ICD-9-CM: V65.49  12/8/2015    Overview Addendum 12/15/2017  9:27 AM by Shmuel Castro MD     Initial ACP discussion. AMD form reviewed and copy provided. NN asst pt 12-8-15, copy in chart.  ACP note updated              Sleep related leg cramps ICD-10-CM: G47.62  ICD-9-CM: 327.52  12/29/2011    Overview Signed 12/29/2011  8:51 AM by Shmuel Castro MD     Improved w/ OTC Mg++             Atypical ductal hyperplasia of left breast, 2005 ICD-10-CM: N60.99  ICD-9-CM: 610.8  12/29/2011    Overview Addendum 12/29/2014  9:50 AM by Shmuel Castro MD     Followed routinely 2 x a year at [facility], Dr Ronak Hair             Lipoma of right breast, 2000 ICD-10-CM: D17.1  ICD-9-CM: 214.8  12/29/2011        Cataract, right eye ICD-10-CM: H26.9  ICD-9-CM: 366.9  12/29/2011    Overview Signed 12/29/2011  9:03 AM by Shmuel Castro MD     Early-2011             S/P colonoscopy 2009, 2014 ICD-10-CM: B27.943  ICD-9-CM: V45.89  11/9/2010    Overview Addendum 12/29/2014  9:52 AM by Shmuel Castro MD     [place]; F/U 3 yrs recommended d/t poor study/incomplete; had Barium Enema study instead which was normal; Colonoscopy 3/2014 Dr Tim Negro, polyp             Retinal hemorrhage, right eye ICD-10-CM: H35.61  ICD-9-CM: 362.81  8/16/2010    Overview Addendum 12/28/2012  9:23 AM by Shmuel Castro MD     ~2009; Dr Hunter Buitrago             Fibrocystic breasts ICD-10-CM: N60.19  ICD-9-CM: 610.1  8/16/2010        Postmenopause s/p hysterectomy s/p HRT [id number] ICD-10-CM: Z78.0  ICD-9-CM: V49.81 8/16/2010        H/O Mild Chronic Renal Insufficiency ICD-10-CM: N28.9  ICD-9-CM: 593.9  8/16/2010    Overview Signed 8/16/2010  8:22 PM by Macho Bailey MD     2009, [place], Dr Chilango Rojas FHx: Breast Cancer ICD-10-CM: Z80.3  ICD-9-CM: V16.3  8/16/2010                  PAST SURGICAL HISTORY     Past Surgical History:   Procedure Laterality Date    HX BREAST BIOPSY  4/2005, Dr Artruo Ramírez    left breast, atypical ductal hyperplasia; Dr Arturo Ramírez, [address] [facility] [address]    HX CATARACT REMOVAL  08/14/2019    right eye cataract     HX CATARACT REMOVAL  08/28/2019    left eye cataract     HX COLONOSCOPY  3/2014    Colon polyp, Dr Jerry Edouard, f/u 3 yrs    HX COLONOSCOPY  04/01/2017    \"normal\", repeat 10 yrs, Dr Tracy Bose  2009    Doyle Fuchs, f/u 5 yrs    HX PREMALIG/BENIGN SKIN LESION EXCISION Left 11/14/2017    left lateral knee     HX CYNTHIA AND BSO  2002    AUB, Fibroids    TAYA MAMMOGRAM SCREENING BILATERAL  Routinely at [address]    q6months d/t strong FHX & h/o ADH; Dr Taylor Kruse PAP, LIQUID BASED, MANUAL SCREEN  2/2010    normal, [place]    [place] REPAIR OF HAMMERTOE,ONE  2008    right 5th toe         MEDICATIONS     Current Outpatient Medications   Medication Sig    DANDELION ROOT PO Take  by mouth daily.  GLUCOSAM/CHOND-MSM1/C/YESI/BOR (GLUCOSAMINE-CHOND-MSM COMPLEX PO) Take 1 Tab by mouth daily.  zinc 50 mg tab tablet Take  by mouth daily.  XIANG ROOT (XIANG, ZINGIBER OFFICINALIS,) 550 mg cap Take 550 mg by mouth two (2) times a day.  Amino Acids-Whey Pro Con & Iso (WHEY PROTEIN) 25 gram-140 kcal/34 gram powd Take  by mouth daily.  magnesium 500 mg Tab Take  by mouth nightly.  ascorbic acid (VITAMIN C) 1,000 mg tablet Take 1,000 mg by mouth two (2) times a day.  FLAXSEED OIL PO Take 1 Cap by mouth two (2) times a day.  Cholecalciferol, Vitamin D3, (VITAMIN D) 5,000 unit Tab Take  by mouth daily.  Since 2008    multivitamin (ONE A DAY) tablet Take 1 Tab by mouth daily.  omega-3 fatty acids-vitamin e (FISH OIL) 1,000 mg Cap Take  by mouth two (2) times a day.  CALCIUM CARBONATE/VITAMIN D3 (CALCIUM 500 + D, D3, PO) Take  by mouth two (2) times a day. No current facility-administered medications for this visit. ALLERGIES     Allergies   Allergen Reactions    Codeine Itching          SOCIAL HISTORY     Social History     Tobacco Use    Smoking status: Never Smoker    Smokeless tobacco: Never Used   Substance Use Topics    Alcohol use: No     Comment: none     Social History     Social History Narrative     since 55; Native of [place]; Prior PCP in [place], Dr Artis Veloz in [place], lives alone in her townhouse. Occupation: Retired from her job May 2014: formerly at [employer]    3 children, 9 grandchildren; daughter Riccarod Stephens and son Aris Rubin nearby, son Augie Carbajal in [place]    Does her own ADL's. Still drives. Manages her own medications and financial affairs. Patient completed Advanced Directive , copy in chart.      Updating AMD 2-2022, copy given to patient    Verified primary HCDM in chart grand daughter Dayana Cobos        Diet: healthy, balanced diet, lots of veggies, salads, seldom any fried foods; loves bread; drinks a lot of water    Caffeine: 2 cups of decaff herbal green tea qpm, no coffee or sodas    Exercise: aerobics video 4 x a week x 45-60 minutes, light weights 2-3 x a week, walks in nicer weather almost every day                         Social History     Substance and Sexual Activity   Sexual Activity Not Currently    Comment: not since her divorce in [address] [place] [address]     Immunization History   Administered Date(s) Administered    Hep B Vaccine 01/01/2013, 02/01/2013, 06/01/2013    Influenza High Dose Vaccine PF 12/29/2014, 12/08/2015, 12/09/2016, 10/12/2017, 10/18/2019, 10/29/2021    Influenza Vaccine 2012, 2013, 10/23/2020    Influenza Vaccine (Tri) Adjuvanted (>65 Yrs FLUAD TRI 40321) 2018    Pneumococcal Conjugate (PCV-13) 2016    Pneumococcal Polysaccharide (PPSV-23) 2014    TB Skin Test (PPD) Intradermal 2005, 2006    Tdap 10/23/2007, 2019    Zoster Vaccine, Live 2016         FAMILY HISTORY     Family History   Problem Relation Age of Onset    Diabetes Mother     Stroke Mother     Hypertension Mother     High Cholesterol Mother     Hypertension Father     Heart Disease Father     Seizures Maternal Grandmother     Diabetes Maternal Grandfather     Heart Disease Maternal Grandfather     Ovarian Cancer Sister     Breast Cancer Paternal Aunt          in her 42's    Breast Cancer Daughter 32        dx age 31; my pt; B Mastectomy         VITALS     Visit Vitals  /82 (BP 1 Location: Right upper arm, BP Cuff Size: Large adult)   Pulse 65   Temp 98.1 °F (36.7 °C) (Oral)   Resp 16   Ht 5' 9\" (1.753 m)   Wt 226 lb 3.2 oz (102.6 kg)   SpO2 97%   BMI 33.40 kg/m²          PHYSICAL EXAMINATION   Physical Exam  Vitals reviewed. Constitutional:       General: She is not in acute distress. Appearance: Normal appearance. HENT:      Right Ear: Tympanic membrane normal.      Left Ear: Tympanic membrane normal.   Eyes:      Conjunctiva/sclera: Conjunctivae normal.   Neck:      Thyroid: No thyroid mass, thyromegaly or thyroid tenderness. Vascular: No carotid bruit. Cardiovascular:      Rate and Rhythm: Normal rate and regular rhythm. Heart sounds: Normal heart sounds. No murmur heard. No gallop. Pulmonary:      Effort: Pulmonary effort is normal. No respiratory distress. Breath sounds: Normal breath sounds. Abdominal:      General: There is no distension. Palpations: Abdomen is soft. Tenderness: There is no abdominal tenderness. Musculoskeletal:         General: No swelling or tenderness.  Normal range of motion. Cervical back: Neck supple. Right lower leg: No edema. Left lower leg: No edema. Lymphadenopathy:      Cervical: No cervical adenopathy. Skin:     General: Skin is warm and dry. Neurological:      General: No focal deficit present. Mental Status: She is alert and oriented to person, place, and time. Mental status is at baseline.       Gait: Gait normal.   Psychiatric:         Mood and Affect: Mood normal.         Cognition and Memory: Cognition and memory normal.                LABORATORY DATA/ANCILLARY/IMAGING     Results for orders placed or performed in visit on 12/22/20   URINALYSIS W/ REFLEX CULTURE    Specimen: Urine   Result Value Ref Range    Color YELLOW/STRAW      Appearance CLEAR CLEAR      Specific gravity 1.009 1.003 - 1.030      pH (UA) 5.5 5.0 - 8.0      Protein Negative Negative mg/dL    Glucose Negative Negative mg/dL    Ketone Negative Negative mg/dL    Bilirubin Negative Negative      Blood Negative Negative      Urobilinogen 0.2 0.2 - 1.0 EU/dL    Nitrites Negative Negative      Leukocyte Esterase Negative Negative      UA:UC IF INDICATED CULTURE NOT INDICATED BY UA RESULT CULTURE NOT INDICATED BY UA RESULT      WBC 0-4 0 - 4 /hpf    RBC 0-5 0 - 5 /hpf    Epithelial cells FEW FEW /lpf    Bacteria Negative Negative /hpf    Hyaline cast 0-2 0 - 5 /lpf   TSH 3RD GENERATION   Result Value Ref Range    TSH 2.40 0.36 - 3.74 uIU/mL   LIPID PANEL   Result Value Ref Range    LIPID PROFILE          Cholesterol, total 164 <200 MG/DL    Triglyceride 71 <150 MG/DL    HDL Cholesterol 64 MG/DL    LDL, calculated 85.8 0 - 100 MG/DL    VLDL, calculated 14.2 MG/DL    CHOL/HDL Ratio 2.6 0.0 - 5.0     METABOLIC PANEL, COMPREHENSIVE   Result Value Ref Range    Sodium 138 136 - 145 mmol/L    Potassium 4.4 3.5 - 5.1 mmol/L    Chloride 106 97 - 108 mmol/L    CO2 29 21 - 32 mmol/L    Anion gap 3 (L) 5 - 15 mmol/L    Glucose 84 65 - 100 mg/dL    BUN 20 6 - 20 MG/DL    Creatinine 1.14 (H) 0.55 - 1.02 MG/DL    BUN/Creatinine ratio 18 12 - 20      GFR est AA 57 (L) >60 ml/min/1.73m2    GFR est non-AA 47 (L) >60 ml/min/1.73m2    Calcium 9.3 8.5 - 10.1 MG/DL    Bilirubin, total 0.8 0.2 - 1.0 MG/DL    ALT (SGPT) 28 12 - 78 U/L    AST (SGOT) 18 15 - 37 U/L    Alk. phosphatase 89 45 - 117 U/L    Protein, total 7.8 6.4 - 8.2 g/dL    Albumin 3.9 3.5 - 5.0 g/dL    Globulin 3.9 2.0 - 4.0 g/dL    A-G Ratio 1.0 (L) 1.1 - 2.2     CBC W/O DIFF   Result Value Ref Range    WBC 4.3 3.6 - 11.0 K/uL    RBC 4.98 3.80 - 5.20 M/uL    HGB 13.7 11.5 - 16.0 g/dL    HCT 43.0 35.0 - 47.0 %    MCV 86.3 80.0 - 99.0 FL    MCH 27.5 26.0 - 34.0 PG    MCHC 31.9 30.0 - 36.5 g/dL    RDW 13.5 11.5 - 14.5 %    PLATELET 613 503 - 360 K/uL    MPV 12.0 8.9 - 12.9 FL    NRBC 0.0 0  WBC    ABSOLUTE NRBC 0.00 0.00 - 0.01 K/uL             ASSESSMENT & PLAN   Diagnoses and all orders for this visit:    1. Medicare annual wellness visit, subsequent  See separate note under this same encounter visit for Medicare Wellness note. 2. Benign essential hypertension, stable, controlled w/ healthy lifestyle modifications  -     METABOLIC PANEL, COMPREHENSIVE; Future  -     LIPID PANEL; Future  -     TSH 3RD GENERATION; Future    3. Stage 3a chronic kidney disease ([facility] [address]  Assessment & Plan:  Stable over time. Followed annually by Nephrologist Dr Earlene Becker. Last seen ~ 1 yr ago. Orders:  -     CBC W/O DIFF; Future  -     METABOLIC PANEL, COMPREHENSIVE; Future    4. BMI 33.0-33.9,adult  -     LIPID PANEL; Future  -     TSH 3RD GENERATION; Future  -     HEMOGLOBIN A1C WITH EAG; Future    5. Family history of diabetes mellitus type II  -     HEMOGLOBIN A1C WITH EAG; Future    Fasting labs checked today  Cardiovascular risk and specific lipid/LDL/BP goals reviewed  Reviewed diet, nutrition, exercise, weight management, BMI/goals. Age/risk based screening recommendations, health maintenance & prevention counseling.    Cancer screening USPTFS guidelines reviewed w/ pt today. Discussed benefits/positive/negative outcomes of screening based on age/risk stratification. Informed consent for/against screening based on pt's personal hx/risk factors. Updated in history above and health maintenance. Sees gyn for well woman visits  Mammogram screens annually at [facility], reportedly normal ~, requested  Colonoscopy up to date  Further follow up & other recommendations pending review of labs.  If all remains good and stable, follow up in 1 yr

## 2022-02-01 NOTE — PATIENT INSTRUCTIONS

## 2022-02-01 NOTE — PROGRESS NOTES
This is the Subsequent Medicare Annual Wellness Exam, performed 12 months or more after the Initial AWV or the last Subsequent AWV    I have reviewed the patient's medical history in detail and updated the computerized patient record. Assessment/Plan   Education and counseling provided:  Are appropriate based on today's review and evaluation    1. Medicare annual wellness visit, subsequent       Depression Risk Factor Screening     3 most recent PHQ Screens 2/1/2022   Little interest or pleasure in doing things Not at all   Feeling down, depressed, irritable, or hopeless Not at all   Total Score PHQ 2 0       Alcohol & Drug Abuse Risk Screen    Do you average more than 1 drink per night or more than 7 drinks a week:  No    On any one occasion in the past three months have you have had more than 3 drinks containing alcohol:  No          Functional Ability and Level of Safety    Hearing: Hearing is good. Activities of Daily Living: The home contains: no safety equipment. Patient does total self care  ADL Assessment 2/1/2022   Feeding yourself No Help Needed   Getting from bed to chair No Help Needed   Getting dressed No Help Needed   Bathing or showering No Help Needed   Walk across the room (includes cane/walker) No Help Needed   Using the telphone No Help Needed   Taking your medications No Help Needed   Preparing meals No Help Needed   Managing money (expenses/bills) No Help Needed   Moderately strenuous housework (laundry) No Help Needed   Shopping for personal items (toiletries/medicines) No Help Needed   Shopping for groceries No Help Needed   Driving No Help Needed   Climbing a flight of stairs No Help Needed   Getting to places beyond walking distances No Help Needed         Ambulation: with no difficulty     Fall Risk:  Fall Risk Assessment, last 12 mths 2/1/2022   Able to walk? Yes   Fall in past 12 months? 0   Do you feel unsteady?  0   Are you worried about falling 0      Abuse Screen:  Patient is not abused       Cognitive Screening    Has your family/caregiver stated any concerns about your memory: no         Health Maintenance Due     Health Maintenance Due   Topic Date Due    Breast Cancer Screen Mammogram  10/23/2021    Depression Screen  12/22/2021       Patient Care Team   Patient Care Team:  Sharonda Gonzalez MD as PCP - General  Sharonda Gonzalez MD as PCP - Josefina Smalls Provider    History     Patient Active Problem List   Diagnosis Code    Retinal hemorrhage, right eye H35.61    Fibrocystic breasts N60.19    Postmenopause s/p hysterectomy s/p HRT 7111-2392 Z78.0    H/O Mild Chronic Renal Insufficiency N28.9    Strong FHx: Breast Cancer Z80.3    S/P colonoscopy 2009, 2014 Z98.890    Sleep related leg cramps G47.62    Atypical ductal hyperplasia of left breast, 2005 N60.99    Lipoma of right breast, 2000 D17.1    Cataract, right eye H26.9    Advance care planning Z71.89    Acute onset of severe vertigo R42    Submammary Candidal Intertrigo B37.2    Migraine without aura G43.009    CKD (chronic kidney disease) stage 3, GFR 30-59 ml/min ([facility]) N18.30    Benign essential hypertension I10    Severe obesity ([address] E66.01     Past Medical History:   Diagnosis Date    Acute onset of severe vertigo 12/9/2016 9-21-16 [facility]: labs, cardiac workup and brain MRI normal; dx w/ inner ear; resolved    Advance care planning 12/8/2015    Advance care planning 12/8/2015    Initial ACP discussion. AMD form reviewed and copy provided.  NN to d/w pt 12-8-15    Atypical ductal hyperplasia of left breast, 2005 12/29/2011    Benign essential hypertension 12/15/2017    Cataract, right eye 12/29/2011    CKD (chronic kidney disease) stage 3, GFR 30-59 ml/min ([facility]) 1/2/2017    Referred to nephrology 1-2017    Fibrocystic breasts 8/16/2010    H/O Chronic Renal Insufficiency 8/16/2010    Lipoma of right breast, 2000 12/29/2011    Migraine without aura 12/9/2016    In her 29's: triggered by cheese, cow's milk, certain sweets/chocolate    Postmenopause s/p hysterectomy s/p HRT [id number] 8/16/2010    Retinal hemorrhage, right eye 8/16/2010    S/P colonoscopy 2009, 2014 11/9/2010    [place]; F/U 3 yrs recommended d/t poor study/incomplete; had Barium Enema study instead which was normal; Colonoscopy 3/2014 Dr Koki Jackson, polyp    Sleep related leg cramps 12/29/2011    Strong FHx: Breast Cancer 8/16/2010    Submammary Candidal Intertrigo 12/9/2016    Dermatologist in [address] [place] [address], Dr Estefany Morrow    Vertigo 09/2016      Past Surgical History:   Procedure Laterality Date    HX BREAST BIOPSY  4/2005, Dr Pedrito Johnson    left breast, atypical ductal hyperplasia; Dr Pedrito Johnson, [address] [facility] [address]    HX CATARACT REMOVAL  08/14/2019    right eye cataract     HX CATARACT REMOVAL  08/28/2019    left eye cataract     HX COLONOSCOPY  3/2014    Colon polyp, Dr Koki Jackson, f/u 3 yrs    HX COLONOSCOPY  04/01/2017    \"normal\", repeat 10 yrs, Dr Dayo Beaver  2009    Mireille Barb, f/u 5 yrs    HX PREMALIG/BENIGN SKIN LESION EXCISION Left 11/14/2017    left lateral knee     HX CYNTHIA AND BSO  2002    AUB, Fibroids    TAYA MAMMOGRAM SCREENING BILATERAL  Routinely at [address]    q6months d/t strong FHX & h/o ADH; Dr Perez Mins PAP, LIQUID BASED, MANUAL SCREEN  2/2010    normal, [place]    [place] REPAIR OF HAMMERTOE,ONE  2008    right 5th toe     Current Outpatient Medications   Medication Sig Dispense Refill    DANDELION ROOT PO Take  by mouth daily.  GLUCOSAM/CHOND-MSM1/C/YESI/BOR (GLUCOSAMINE-CHOND-MSM COMPLEX PO) Take 1 Tab by mouth daily.  zinc 50 mg tab tablet Take  by mouth daily.  XIANG ROOT (XIANG, ZINGIBER OFFICINALIS,) 550 mg cap Take 550 mg by mouth two (2) times a day.  Amino Acids-Whey Pro Con & Iso (WHEY PROTEIN) 25 gram-140 kcal/34 gram powd Take  by mouth daily.  magnesium 500 mg Tab Take  by mouth nightly.  ascorbic acid (VITAMIN C) 1,000 mg tablet Take 1,000 mg by mouth two (2) times a day.  FLAXSEED OIL PO Take 1 Cap by mouth two (2) times a day.  Cholecalciferol, Vitamin D3, (VITAMIN D) 5,000 unit Tab Take  by mouth daily. Since       multivitamin (ONE A DAY) tablet Take 1 Tab by mouth daily.  omega-3 fatty acids-vitamin e (FISH OIL) 1,000 mg Cap Take  by mouth two (2) times a day.  CALCIUM CARBONATE/VITAMIN D3 (CALCIUM 500 + D, D3, PO) Take  by mouth two (2) times a day.        Allergies   Allergen Reactions    Codeine Itching       Family History   Problem Relation Age of Onset    Diabetes Mother     Stroke Mother     Hypertension Mother     High Cholesterol Mother     Hypertension Father     Heart Disease Father     Seizures Maternal Grandmother     Diabetes Maternal Grandfather     Heart Disease Maternal Grandfather     Ovarian Cancer Sister     Breast Cancer Paternal Aunt          in her 42's    Breast Cancer Daughter 32        dx age 31; my pt; B Mastectomy     Social History     Tobacco Use    Smoking status: Never Smoker    Smokeless tobacco: Never Used   Substance Use Topics    Alcohol use: No     Comment: none         Lee Whalen MD

## 2022-02-02 LAB
ALBUMIN SERPL-MCNC: 3.9 G/DL (ref 3.5–5)
ALBUMIN/GLOB SERPL: 1.1 {RATIO} (ref 1.1–2.2)
ALP SERPL-CCNC: 74 U/L (ref 45–117)
ALT SERPL-CCNC: 26 U/L (ref 12–78)
ANION GAP SERPL CALC-SCNC: 2 MMOL/L (ref 5–15)
AST SERPL-CCNC: 19 U/L (ref 15–37)
BILIRUB SERPL-MCNC: 0.7 MG/DL (ref 0.2–1)
BUN SERPL-MCNC: 11 MG/DL (ref 6–20)
BUN/CREAT SERPL: 11 (ref 12–20)
CALCIUM SERPL-MCNC: 9.6 MG/DL (ref 8.5–10.1)
CHLORIDE SERPL-SCNC: 107 MMOL/L (ref 97–108)
CHOLEST SERPL-MCNC: 132 MG/DL
CO2 SERPL-SCNC: 30 MMOL/L (ref 21–32)
CREAT SERPL-MCNC: 0.97 MG/DL (ref 0.55–1.02)
ERYTHROCYTE [DISTWIDTH] IN BLOOD BY AUTOMATED COUNT: 13.2 % (ref 11.5–14.5)
EST. AVERAGE GLUCOSE BLD GHB EST-MCNC: 111 MG/DL
GLOBULIN SER CALC-MCNC: 3.4 G/DL (ref 2–4)
GLUCOSE SERPL-MCNC: 92 MG/DL (ref 65–100)
HBA1C MFR BLD: 5.5 % (ref 4–5.6)
HCT VFR BLD AUTO: 42.1 % (ref 35–47)
HDLC SERPL-MCNC: 48 MG/DL
HDLC SERPL: 2.8 {RATIO} (ref 0–5)
HGB BLD-MCNC: 13.4 G/DL (ref 11.5–16)
LDLC SERPL CALC-MCNC: 67.6 MG/DL (ref 0–100)
MCH RBC QN AUTO: 27.7 PG (ref 26–34)
MCHC RBC AUTO-ENTMCNC: 31.8 G/DL (ref 30–36.5)
MCV RBC AUTO: 87.2 FL (ref 80–99)
NRBC # BLD: 0 K/UL (ref 0–0.01)
NRBC BLD-RTO: 0 PER 100 WBC
PLATELET # BLD AUTO: 222 K/UL (ref 150–400)
PMV BLD AUTO: 11.9 FL (ref 8.9–12.9)
POTASSIUM SERPL-SCNC: 4.3 MMOL/L (ref 3.5–5.1)
PROT SERPL-MCNC: 7.3 G/DL (ref 6.4–8.2)
RBC # BLD AUTO: 4.83 M/UL (ref 3.8–5.2)
SODIUM SERPL-SCNC: 139 MMOL/L (ref 136–145)
TRIGL SERPL-MCNC: 82 MG/DL (ref ?–150)
TSH SERPL DL<=0.05 MIU/L-ACNC: 2.62 UIU/ML (ref 0.36–3.74)
VLDLC SERPL CALC-MCNC: 16.4 MG/DL
WBC # BLD AUTO: 4.2 K/UL (ref 3.6–11)

## 2022-03-18 PROBLEM — E66.01 SEVERE OBESITY (HCC): Status: ACTIVE | Noted: 2020-12-22

## 2022-03-18 PROBLEM — I10 BENIGN ESSENTIAL HYPERTENSION: Status: ACTIVE | Noted: 2017-12-15

## 2022-03-20 PROBLEM — N18.30 CKD (CHRONIC KIDNEY DISEASE) STAGE 3, GFR 30-59 ML/MIN (HCC): Status: ACTIVE | Noted: 2017-01-02

## 2022-10-19 NOTE — PROGRESS NOTES
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 [id number] Test date: 10/2/2022 : 1961 Referring Service: Age: 60 year old Referring Physician: Christopher Magaña MD Procedure: videoEEG/ LTM Interpreting Physician: Carly Lemos MD Introduction: The patient is a 60 year old old female with a history of ams Procedure: This is a 21-channel digital EEG performed using the international 10-20 system of electrode placement. Multiple reformatable montages were used in review. Video recording was available for review Baseline EEG Recording: The baseline recording was obtained with a number of standard bipolar and referential montages during wakefulness and drowsiness. In the alert state the posterior background rhythm was a symmetric, 7-8 Hz rhythm which reacted symmetrically to eye opening.During drowsiness the  outcomes of screening based on age/risk stratification. Informed consent for/against screening based on pt's personal hx/risk factors. Updated in history above and health maintenance. Sees Dr. Juanito Kaiser annually for breast exams and mammogram screens, reportedly negative just last month. Colonoscopy screen up to date 2017. Patient states GI advised annual FIT til next screening. Order given today  Further follow up & other recommendations pending review of labs.  If all remains good and stable, follow up in 1 yr, sooner prn background rhythm waxed and waned and there were periods of slowing. There were no focal abnormalities. There were no interictal epileptiform abnormalities and no clinical or electrographic seizures occurred during the baseline. The EKG channel revealed no abnormalities. Interictal EEG Samples: REM sleep was seen Ictal Recordings: During this period the patient had none of their typical events. Summary: During this day of recording no events were recorded. Monitoring was continued in order to record the patient's typical events. The EKG channel revealed no abnormalities. Clinical Correlation: This  EEG recording showed mild encephalopathy. No seizures, epileptiform discharges, or typical events were recorded. EEG was started around 8;30 pm 10/2 . and was read up to 8;38 am 10/3 Carly Lemos MD Epilepsy and Sleep Medicine.      Pending Results:  Unresulted Labs (From admission, onward)     Start     Ordered    10/16/22 1238  CBC with Automated Differential  AM DRAW,   Routine       10/16/22 1237    10/16/22 1238  Basic Metabolic Panel  AM DRAW,   Routine       10/16/22 1237    10/08/22 1821  SPUTUM, BACTERIAL CULTURE WITH GRAM STAIN  ONE TIME,   Routine         10/07/22 1815    10/08/22 1820  Staphylococcus aureus Methicillin Resistant (MRSA) PCR  ONE TIME,   Routine         10/07/22 1815    10/07/22 2100  Metered Blood Glucose 4 times daily, before meals and bedtime  4 TIMES DAILY BEFORE MEALS & N,   Routine       10/07/22 1818              Unresulted Procedure (From admission, onward)    None          Discharge Exam:    Blood pressure 112/63, pulse 77, temperature 98.1 °F (36.7 °C), temperature source Oral, resp. rate 18, height 5' 4\" (1.626 m), weight 94.8 kg (208 lb 15.9 oz), last menstrual period 11/15/2012, SpO2 95 %.  General: Looks well well developed, well nourished and no apparent distress  CV: regular rate and rhythm and no murmurs, rubs, or thrills  Resp: coarse breath breath sounds, mild  wheezing  Abd: soft, nontender, nondistended and no hepatosplenomegaly  Ext: no clubbing, cyanosis, or ischemia  Skin: no rashes, lesions, or ulcers noted  Neuro: no focal deficits noted  Psych: normal judgement and insight and oriented to time, place and person     Patient Discharge Instructions:   1. Activity:  activity as tolerated  2. Diet:  Consistent Carb Moderate (45-75 Gm/meal) Diet  Npo Diet Without Exceptions  3. Wound Care:  none needed    Follow-up:  Juan Boothe MD  [address]  [address]  [facility] [address]  [phone]    Follow up on 10/27/2022  [facility] Follow-up Appointment @ 11:15am      Time spent on discharge was 40 minutes.  Discharge discussed with the patient, nurse, pulmonary, case management, and social work    Signed:  Gemini Carrillo MD  10/19/2022  8:25 AM    LACE(10 or more):High  Total Score: 15           5 LACE Length of Stay    3 LACE Acute Admission    5 LACE Charlson Comorbidity Index Evalution    2 LACE Visits to ED Previous 6 Months

## 2023-06-20 ENCOUNTER — HOSPITAL ENCOUNTER (OUTPATIENT)
Facility: HOSPITAL | Age: 73
Discharge: HOME | End: 2023-06-23
Payer: MEDICARE

## 2023-06-20 DIAGNOSIS — Z13.820 SCREENING FOR OSTEOPOROSIS: ICD-10-CM

## 2023-06-20 DIAGNOSIS — Z78.0 ASYMPTOMATIC POSTMENOPAUSAL STATE: ICD-10-CM

## 2023-06-20 PROCEDURE — 77080 DXA BONE DENSITY AXIAL: CPT

## 2023-07-20 ENCOUNTER — OFFICE VISIT (OUTPATIENT)
Age: 73
End: 2023-07-20
Payer: MEDICARE

## 2023-07-20 VITALS
SYSTOLIC BLOOD PRESSURE: 130 MMHG | HEIGHT: 67 IN | BODY MASS INDEX: 33.06 KG/M2 | TEMPERATURE: 98.1 F | HEART RATE: 68 BPM | OXYGEN SATURATION: 97 % | WEIGHT: 210.6 LBS | RESPIRATION RATE: 16 BRPM | DIASTOLIC BLOOD PRESSURE: 68 MMHG

## 2023-07-20 DIAGNOSIS — Z00.00 MEDICARE ANNUAL WELLNESS VISIT, SUBSEQUENT: Primary | ICD-10-CM

## 2023-07-20 PROCEDURE — 1123F ACP DISCUSS/DSCN MKR DOCD: CPT | Performed by: FAMILY MEDICINE

## 2023-07-20 PROCEDURE — 3078F DIAST BP <80 MM HG: CPT | Performed by: FAMILY MEDICINE

## 2023-07-20 PROCEDURE — 3075F SYST BP GE 130 - 139MM HG: CPT | Performed by: FAMILY MEDICINE

## 2023-07-20 PROCEDURE — 3017F COLORECTAL CA SCREEN DOC REV: CPT | Performed by: FAMILY MEDICINE

## 2023-07-20 PROCEDURE — G0439 PPPS, SUBSEQ VISIT: HCPCS | Performed by: FAMILY MEDICINE

## 2023-07-20 SDOH — ECONOMIC STABILITY: FOOD INSECURITY: WITHIN THE PAST 12 MONTHS, THE FOOD YOU BOUGHT JUST DIDN'T LAST AND YOU DIDN'T HAVE MONEY TO GET MORE.: NEVER TRUE

## 2023-07-20 SDOH — ECONOMIC STABILITY: INCOME INSECURITY: HOW HARD IS IT FOR YOU TO PAY FOR THE VERY BASICS LIKE FOOD, HOUSING, MEDICAL CARE, AND HEATING?: NOT HARD AT ALL

## 2023-07-20 SDOH — ECONOMIC STABILITY: HOUSING INSECURITY
IN THE LAST 12 MONTHS, WAS THERE A TIME WHEN YOU DID NOT HAVE A STEADY PLACE TO SLEEP OR SLEPT IN A SHELTER (INCLUDING NOW)?: NO

## 2023-07-20 SDOH — ECONOMIC STABILITY: FOOD INSECURITY: WITHIN THE PAST 12 MONTHS, YOU WORRIED THAT YOUR FOOD WOULD RUN OUT BEFORE YOU GOT MONEY TO BUY MORE.: NEVER TRUE

## 2023-07-20 ASSESSMENT — PATIENT HEALTH QUESTIONNAIRE - PHQ9
2. FEELING DOWN, DEPRESSED OR HOPELESS: 0
SUM OF ALL RESPONSES TO PHQ QUESTIONS 1-9: 0
1. LITTLE INTEREST OR PLEASURE IN DOING THINGS: 0
SUM OF ALL RESPONSES TO PHQ9 QUESTIONS 1 & 2: 0

## 2023-07-20 ASSESSMENT — LIFESTYLE VARIABLES
HOW OFTEN DO YOU HAVE A DRINK CONTAINING ALCOHOL: NEVER
HOW MANY STANDARD DRINKS CONTAINING ALCOHOL DO YOU HAVE ON A TYPICAL DAY: PATIENT DOES NOT DRINK

## 2023-07-20 NOTE — PROGRESS NOTES
Chief Complaint   Patient presents with    Medicare AWV     1. \"Have you been to the ER, urgent care clinic since your last visit? Hospitalized since your last visit? \" No    2. \"Have you seen or consulted any other health care providers outside of the [address] [place] [address] since your last visit? \" No     3. For patients aged 43-73: Has the patient had a colonoscopy / FIT/ Cologuard? Yes - no Care Gap present 4/2017 \"Normal\", repeat 10 yrs, Dr Armando Ratliff      If the patient is female:    4. For patients aged 43-66: Has the patient had a mammogram within the past 2 years? Yes - no Care Gap present 11/2023      5. For patients aged 21-65: Has the patient had a pap smear?  NA - based on age or sex

## 2023-07-20 NOTE — ACP (ADVANCE CARE PLANNING)
Patient has an AMD on file. She plans to change her primary HCDM which has been updated in chart today as her grand daughter Kristina Webster. She was provided a new copy to update/complete and drop that off at the office in the next few weeks.  Information was provided about Chad Finn in her patient education materials w/ today's AVS.  7/20/23

## 2023-07-20 NOTE — PATIENT INSTRUCTIONS

## 2024-07-23 ENCOUNTER — OFFICE VISIT (OUTPATIENT)
Age: 74
End: 2024-07-23
Payer: MEDICARE

## 2024-07-23 VITALS
HEIGHT: 67 IN | TEMPERATURE: 98.2 F | WEIGHT: 217.4 LBS | RESPIRATION RATE: 16 BRPM | DIASTOLIC BLOOD PRESSURE: 80 MMHG | HEART RATE: 86 BPM | OXYGEN SATURATION: 98 % | SYSTOLIC BLOOD PRESSURE: 138 MMHG | BODY MASS INDEX: 34.12 KG/M2

## 2024-07-23 DIAGNOSIS — I10 BENIGN ESSENTIAL HYPERTENSION: ICD-10-CM

## 2024-07-23 DIAGNOSIS — Z00.00 MEDICARE ANNUAL WELLNESS VISIT, SUBSEQUENT: Primary | ICD-10-CM

## 2024-07-23 DIAGNOSIS — N18.31 CHRONIC KIDNEY DISEASE, STAGE 3A (HCC): ICD-10-CM

## 2024-07-23 LAB
ALBUMIN SERPL-MCNC: 4 G/DL (ref 3.5–5)
ALBUMIN/GLOB SERPL: 1 (ref 1.1–2.2)
ALP SERPL-CCNC: 106 U/L (ref 45–117)
ALT SERPL-CCNC: 27 U/L (ref 12–78)
ANION GAP SERPL CALC-SCNC: 6 MMOL/L (ref 5–15)
AST SERPL-CCNC: 21 U/L (ref 15–37)
BILIRUB SERPL-MCNC: 1 MG/DL (ref 0.2–1)
BUN SERPL-MCNC: 9 MG/DL (ref 6–20)
BUN/CREAT SERPL: 8 (ref 12–20)
CALCIUM SERPL-MCNC: 9.6 MG/DL (ref 8.5–10.1)
CHLORIDE SERPL-SCNC: 106 MMOL/L (ref 97–108)
CHOLEST SERPL-MCNC: 142 MG/DL
CO2 SERPL-SCNC: 27 MMOL/L (ref 21–32)
CREAT SERPL-MCNC: 1.11 MG/DL (ref 0.55–1.02)
ERYTHROCYTE [DISTWIDTH] IN BLOOD BY AUTOMATED COUNT: 13.3 % (ref 11.5–14.5)
EST. AVERAGE GLUCOSE BLD GHB EST-MCNC: 108 MG/DL
GLOBULIN SER CALC-MCNC: 3.9 G/DL (ref 2–4)
GLUCOSE SERPL-MCNC: 84 MG/DL (ref 65–100)
HBA1C MFR BLD: 5.4 % (ref 4–5.6)
HCT VFR BLD AUTO: 43.8 % (ref 35–47)
HDLC SERPL-MCNC: 57 MG/DL
HDLC SERPL: 2.5 (ref 0–5)
HGB BLD-MCNC: 14.3 G/DL (ref 11.5–16)
LDLC SERPL CALC-MCNC: 70.2 MG/DL (ref 0–100)
MCH RBC QN AUTO: 28 PG (ref 26–34)
MCHC RBC AUTO-ENTMCNC: 32.6 G/DL (ref 30–36.5)
MCV RBC AUTO: 85.9 FL (ref 80–99)
NRBC # BLD: 0 K/UL (ref 0–0.01)
NRBC BLD-RTO: 0 PER 100 WBC
PLATELET # BLD AUTO: 203 K/UL (ref 150–400)
PMV BLD AUTO: 11.9 FL (ref 8.9–12.9)
POTASSIUM SERPL-SCNC: 4.2 MMOL/L (ref 3.5–5.1)
PROT SERPL-MCNC: 7.9 G/DL (ref 6.4–8.2)
RBC # BLD AUTO: 5.1 M/UL (ref 3.8–5.2)
SODIUM SERPL-SCNC: 139 MMOL/L (ref 136–145)
TRIGL SERPL-MCNC: 74 MG/DL
TSH SERPL DL<=0.05 MIU/L-ACNC: 2.06 UIU/ML (ref 0.36–3.74)
VLDLC SERPL CALC-MCNC: 14.8 MG/DL
WBC # BLD AUTO: 3.3 K/UL (ref 3.6–11)

## 2024-07-23 PROCEDURE — 3075F SYST BP GE 130 - 139MM HG: CPT | Performed by: FAMILY MEDICINE

## 2024-07-23 PROCEDURE — G8427 DOCREV CUR MEDS BY ELIG CLIN: HCPCS | Performed by: FAMILY MEDICINE

## 2024-07-23 PROCEDURE — 99214 OFFICE O/P EST MOD 30 MIN: CPT | Performed by: FAMILY MEDICINE

## 2024-07-23 PROCEDURE — 3079F DIAST BP 80-89 MM HG: CPT | Performed by: FAMILY MEDICINE

## 2024-07-23 PROCEDURE — 1123F ACP DISCUSS/DSCN MKR DOCD: CPT | Performed by: FAMILY MEDICINE

## 2024-07-23 PROCEDURE — 1090F PRES/ABSN URINE INCON ASSESS: CPT | Performed by: FAMILY MEDICINE

## 2024-07-23 PROCEDURE — 3017F COLORECTAL CA SCREEN DOC REV: CPT | Performed by: FAMILY MEDICINE

## 2024-07-23 PROCEDURE — G8399 PT W/DXA RESULTS DOCUMENT: HCPCS | Performed by: FAMILY MEDICINE

## 2024-07-23 PROCEDURE — G0439 PPPS, SUBSEQ VISIT: HCPCS | Performed by: FAMILY MEDICINE

## 2024-07-23 PROCEDURE — G8417 CALC BMI ABV UP PARAM F/U: HCPCS | Performed by: FAMILY MEDICINE

## 2024-07-23 PROCEDURE — 1036F TOBACCO NON-USER: CPT | Performed by: FAMILY MEDICINE

## 2024-07-23 ASSESSMENT — ENCOUNTER SYMPTOMS
EYES NEGATIVE: 1
ALLERGIC/IMMUNOLOGIC NEGATIVE: 1
GASTROINTESTINAL NEGATIVE: 1
RESPIRATORY NEGATIVE: 1

## 2024-07-23 NOTE — PROGRESS NOTES
Chief Complaint   Patient presents with    Medicare AWV      fasting    Hypertension     1. \"Have you been to the ER, urgent care clinic since your last visit?  Hospitalized since your last visit?\" No    2. \"Have you seen or consulted any other health care providers outside of the [facility] since your last visit?\" No     3. For patients aged 45-75: Has the patient had a colonoscopy / FIT/ Cologuard? Yes - no Care Gap present 4/2017 \"Normal\", repeat 10 yrs, Dr Barker       If the patient is female:    4. For patients aged 40-74: Has the patient had a mammogram within the past 2 years? Yes - no Care Gap present 12/2023      5. For patients aged 21-65: Has the patient had a pap smear? NA - based on age or sex      
Medicare Annual Wellness Visit    Aisha Muhammad is here for Medicare AWV ( ), Hypertension, and Blood Work (Fasting)      Refer to separate note in this encounter for follow up of patient's chronic conditions, disease management, problems addressed today, and/or other health concerns as noted.       Assessment & Plan   Medicare annual wellness visit, subsequent  Recommendations for Preventive Services Due: see orders and patient instructions/AVS.  Recommended screening schedule for the next 5-10 years is provided to the patient in written form: see Patient Instructions/AVS.            Patient's complete Health Risk Assessment and screening values have been reviewed and are found in Flowsheets. The following problems were reviewed today and where indicated follow up appointments were made and/or referrals ordered.    Positive Risk Factor Screenings with Interventions:       Cognitive:   Clock Drawing Test (CDT): (!) Abnormal  Words recalled: 2 Words Recalled  Total Score: (!) 2  Total Score Interpretation: Abnormal Mini-Cog  Interventions:  See AVS for additional education material              Abnormal BMI (obese):  Body mass index is 34.05 kg/m². (!) Abnormal  Interventions:  See AVS for additional education material  See A/P for plan and any pertinent orders             Advanced Directives:  Do you have a Living Will?: (!) No  Living Will information provided  Intervention:  Patient has an AMD on file                                CareTeam (Including outside providers/suppliers regularly involved in providing care):   Patient Care Team:  Gege Horta MD as PCP - General (Family Medicine)  Gege Horta [facility]      Reviewed and updated this visit:  Tobacco  Allergies  Meds  Problems  Med Hx  Surg Hx  Soc Hx  Fam Hx              
exercise and weight mgt  She will stop adding the pink salt to her water and start monitoring home BP's again a few x a week and update me w/ any persistent elevated BP readings  -     CBC; Future  -     Comprehensive Metabolic Panel; Future  -     Lipid Panel; Future  -     TSH; Future    3. Chronic kidney disease, stage 3a (HCC)  -     CBC; Future  -     Comprehensive Metabolic Panel; Future    4. BMI 34.0-34.9,adult  -     Hemoglobin A1C; Future  -     Lipid Panel; Future  -     TSH; Future    Fasting labs checked today  Reviewed diet, nutrition, exercise, weight management/goals, risk reduction, cardiovascular goals for age and associated health risks, medications, effects, risks, benefits, potential interactions and possible side effects.   Age/risk based screening recommendations, health maintenance & prevention counseling.   Cancer screening USPTFS guidelines reviewed w/ pt today.   Discussed benefits/positive/negative outcomes of screening based on age/risk stratification. Informed consent for/against screening based on pt's personal hx/risk factors.   Updated in history above, health maintenance section of chart and nurse's note.   Mammogram done at [facility]   Colonoscopy up to date 2017 ok  Further follow up & other recommendations pending review of labs.   If all remains good and stable, follow up in 1 yr, sooner prn

## 2024-07-28 ENCOUNTER — TELEPHONE (OUTPATIENT)
Age: 74
End: 2024-07-28

## 2024-07-28 DIAGNOSIS — D72.819 LEUKOPENIA, UNSPECIFIED TYPE: Primary | ICD-10-CM

## 2024-07-28 NOTE — TELEPHONE ENCOUNTER
Advise patient her kidney function remains stable in her baseline range.  Liver and thyroid function normal.  Electrolytes good and normal.  A1c 3-month sugar average is good and normal and not in the diabetes or prediabetes range.  Cholesterol numbers all excellent and at goal range.  Her white blood count is slightly low but close to her baseline range and may be either a lab technicality or normal variant for her.  She usually sees her oncologist Dr. Zhang once or twice a year for breast cancer history.  If she still doing so and does he monitor labs on her?  Otherwise overall things look really good and stable and she can return in 1 year for her next annual checkup or sooner as needed.  Okay to mail copy of labs to patient after review.  Let me know what she says about oncology. Also it looks like last year when I mailed her result letter (6-2023) I also mentioned about her slightly low WBC and had advised her to go have follow up labs done after that time and apparently she never went.

## 2024-07-29 NOTE — TELEPHONE ENCOUNTER
VITALY for return call.    Went over results with pt.  She said that she hasn't see Dr Zhang since before covid.    Advised that I will check with Dr Sanchez about rechecking the WBC and get back with her.  I will put a copy of the labs in the mail to her to the [address] address that is listed.

## 2024-07-31 NOTE — TELEPHONE ENCOUNTER
Advise pt to repeat labs in about 6 weeks. I have placed the orders to be printed (CBC w/ Diff, Smear). Can mail order and list of locations to patient.

## 2024-08-24 ENCOUNTER — TELEPHONE (OUTPATIENT)
Age: 74
End: 2024-08-24

## 2024-08-24 LAB
BASOPHILS # BLD AUTO: 0 X10E3/UL (ref 0–0.2)
BASOPHILS NFR BLD AUTO: 1 %
EOSINOPHIL # BLD AUTO: 0.1 X10E3/UL (ref 0–0.4)
EOSINOPHIL NFR BLD AUTO: 1 %
ERYTHROCYTE [DISTWIDTH] IN BLOOD BY AUTOMATED COUNT: 13.1 % (ref 11.7–15.4)
HCT VFR BLD AUTO: 41.3 % (ref 34–46.6)
HGB BLD-MCNC: 13.3 G/DL (ref 11.1–15.9)
IMM GRANULOCYTES # BLD AUTO: 0 X10E3/UL (ref 0–0.1)
IMM GRANULOCYTES NFR BLD AUTO: 0 %
LYMPHOCYTES # BLD AUTO: 1.8 X10E3/UL (ref 0.7–3.1)
LYMPHOCYTES NFR BLD AUTO: 36 %
MCH RBC QN AUTO: 26.7 PG (ref 26.6–33)
MCHC RBC AUTO-ENTMCNC: 32.2 G/DL (ref 31.5–35.7)
MCV RBC AUTO: 83 FL (ref 79–97)
MONOCYTES # BLD AUTO: 0.3 X10E3/UL (ref 0.1–0.9)
MONOCYTES NFR BLD AUTO: 6 %
NEUTROPHILS # BLD AUTO: 2.8 X10E3/UL (ref 1.4–7)
NEUTROPHILS NFR BLD AUTO: 56 %
PLATELET # BLD AUTO: 207 X10E3/UL (ref 150–450)
RBC # BLD AUTO: 4.98 X10E6/UL (ref 3.77–5.28)
WBC # BLD AUTO: 5.1 X10E3/UL (ref 3.4–10.8)

## 2024-08-25 NOTE — TELEPHONE ENCOUNTER
Let pt know her follow up WBC is normal. Nothing further needed at this time. Will continue monitoring at her routine checkups w/ her other complete labs.

## 2024-08-26 NOTE — TELEPHONE ENCOUNTER
Call and spoke to patient, two ID confirmed. Writer informed patient of lab result. Pt verbalized understanding of information discussed w/ no further questions at this time.   Erendira Dickinson LPN

## 2025-07-28 ENCOUNTER — OFFICE VISIT (OUTPATIENT)
Age: 75
End: 2025-07-28
Payer: MEDICARE

## 2025-07-28 VITALS
HEIGHT: 67 IN | WEIGHT: 219.6 LBS | RESPIRATION RATE: 18 BRPM | BODY MASS INDEX: 34.47 KG/M2 | HEART RATE: 76 BPM | DIASTOLIC BLOOD PRESSURE: 84 MMHG | SYSTOLIC BLOOD PRESSURE: 136 MMHG | OXYGEN SATURATION: 100 % | TEMPERATURE: 97.2 F

## 2025-07-28 DIAGNOSIS — Z00.00 MEDICARE ANNUAL WELLNESS VISIT, SUBSEQUENT: Primary | ICD-10-CM

## 2025-07-28 DIAGNOSIS — Z86.79 HISTORY OF ESSENTIAL HYPERTENSION: ICD-10-CM

## 2025-07-28 DIAGNOSIS — N18.31 CHRONIC KIDNEY DISEASE, STAGE 3A (HCC): ICD-10-CM

## 2025-07-28 PROCEDURE — 3075F SYST BP GE 130 - 139MM HG: CPT | Performed by: FAMILY MEDICINE

## 2025-07-28 PROCEDURE — G8417 CALC BMI ABV UP PARAM F/U: HCPCS | Performed by: FAMILY MEDICINE

## 2025-07-28 PROCEDURE — 1036F TOBACCO NON-USER: CPT | Performed by: FAMILY MEDICINE

## 2025-07-28 PROCEDURE — 1160F RVW MEDS BY RX/DR IN RCRD: CPT | Performed by: FAMILY MEDICINE

## 2025-07-28 PROCEDURE — G8427 DOCREV CUR MEDS BY ELIG CLIN: HCPCS | Performed by: FAMILY MEDICINE

## 2025-07-28 PROCEDURE — G0439 PPPS, SUBSEQ VISIT: HCPCS | Performed by: FAMILY MEDICINE

## 2025-07-28 PROCEDURE — 1090F PRES/ABSN URINE INCON ASSESS: CPT | Performed by: FAMILY MEDICINE

## 2025-07-28 PROCEDURE — 1125F AMNT PAIN NOTED PAIN PRSNT: CPT | Performed by: FAMILY MEDICINE

## 2025-07-28 PROCEDURE — 1159F MED LIST DOCD IN RCRD: CPT | Performed by: FAMILY MEDICINE

## 2025-07-28 PROCEDURE — 99213 OFFICE O/P EST LOW 20 MIN: CPT | Performed by: FAMILY MEDICINE

## 2025-07-28 PROCEDURE — G8399 PT W/DXA RESULTS DOCUMENT: HCPCS | Performed by: FAMILY MEDICINE

## 2025-07-28 PROCEDURE — 3017F COLORECTAL CA SCREEN DOC REV: CPT | Performed by: FAMILY MEDICINE

## 2025-07-28 PROCEDURE — 1123F ACP DISCUSS/DSCN MKR DOCD: CPT | Performed by: FAMILY MEDICINE

## 2025-07-28 PROCEDURE — 3079F DIAST BP 80-89 MM HG: CPT | Performed by: FAMILY MEDICINE

## 2025-07-28 SDOH — ECONOMIC STABILITY: FOOD INSECURITY: WITHIN THE PAST 12 MONTHS, THE FOOD YOU BOUGHT JUST DIDN'T LAST AND YOU DIDN'T HAVE MONEY TO GET MORE.: NEVER TRUE

## 2025-07-28 SDOH — ECONOMIC STABILITY: FOOD INSECURITY: WITHIN THE PAST 12 MONTHS, YOU WORRIED THAT YOUR FOOD WOULD RUN OUT BEFORE YOU GOT MONEY TO BUY MORE.: NEVER TRUE

## 2025-07-28 ASSESSMENT — PATIENT HEALTH QUESTIONNAIRE - PHQ9
2. FEELING DOWN, DEPRESSED OR HOPELESS: NOT AT ALL
SUM OF ALL RESPONSES TO PHQ QUESTIONS 1-9: 0
1. LITTLE INTEREST OR PLEASURE IN DOING THINGS: NOT AT ALL

## 2025-07-28 ASSESSMENT — ENCOUNTER SYMPTOMS
EYES NEGATIVE: 1
ALLERGIC/IMMUNOLOGIC NEGATIVE: 1
RESPIRATORY NEGATIVE: 1
GASTROINTESTINAL NEGATIVE: 1

## 2025-07-28 ASSESSMENT — LIFESTYLE VARIABLES
HOW MANY STANDARD DRINKS CONTAINING ALCOHOL DO YOU HAVE ON A TYPICAL DAY: PATIENT DOES NOT DRINK
HOW OFTEN DO YOU HAVE A DRINK CONTAINING ALCOHOL: NEVER

## 2025-07-28 NOTE — PROGRESS NOTES
Chief Complaint   Patient presents with    Medicare AWV     \"Have you been to the ER, urgent care clinic since your last visit?  Hospitalized since your last visit?\"    NO    “Have you seen or consulted any other health care providers outside of [facility] since your last visit?”    [facility]                    7/28/2025     9:23 AM   PHQ-9    Little interest or pleasure in doing things 0   Feeling down, depressed, or hopeless 0   PHQ-2 Score 0   PHQ-9 Total Score 0           Financial Resource Strain: Low Risk  (7/23/2024)    Overall Financial Resource Strain (CARDIA)     Difficulty of Paying Living Expenses: Not hard at all      Food Insecurity: No Food Insecurity (7/28/2025)    Hunger Vital Sign     Worried About Running Out of Food in the Last Year: Never true     Ran Out of Food in the Last Year: Never true          Health Maintenance Due   Topic Date Due    COVID-19 Vaccine (1 - 2024-25 season) Never done    Respiratory Syncytial Virus (RSV) Pregnant or age 60 yrs+ (1 - 1-dose 75+ series) Never done    Annual Wellness Visit (Medicare)  07/24/2025    Depression Screen  07/23/2025

## 2025-07-28 NOTE — PROGRESS NOTES
Medicare Annual Wellness Visit    Aisha Muhammad is here for Medicare AWV (, Fasting for labs)    Assessment & Plan   Medicare annual wellness visit, subsequent  Recommendations for Preventive Services Due: see orders and patient instructions/AVS.  Recommended screening schedule for the next 5-10 years is provided to the patient in written form: see Patient Instructions/AVS.  Age/risk based screening recommendations, health maintenance & prevention counseling. Cancer screening USPTFS guidelines reviewed w/ pt today. Discussed benefits/positive/negative outcomes of screening based on age/risk stratification. Informed consent for/against screening based on pt's personal hx/risk factors. Updated in history above, health maintenance section of chart and nurse's note. Orders placed if indicated at this time. Immunizations reviewed, recommendations given and updated or ordered if needed.     Chronic kidney disease, stage 3a (HCC)  -Stable in baseline range over the years  -Continue Nsaid avoidance and adequate hydration/fluid intake  -     CBC; Future  -     Comprehensive Metabolic Panel; Future    History of essential hypertension  -Preferentially not treated w/ medications over time. She has been controlling w/ diet/exercise over the years.  -     Comprehensive Metabolic Panel; Future  -     Lipid Panel; Future    BMI 34.0-34.9,adult  -     Hemoglobin A1C; Future  -     Lipid Panel; Future    Fasting labs checked today    Reviewed diet, nutrition, exercise, weight management, weight & lab goals, cardiovascular risk reduction goals for age and potential/associated health risks.    Reviewed medications, effects, risks, benefits, precautions, potential interactions and possible side effects.     Further follow up & other recommendations pending review of labs.     If all remains good and stable:    Return in 1 year (on 7/28/2026) for Medicare AWV.     Subjective   The following acute and/or chronic problems were also

## 2025-07-29 LAB
ALBUMIN SERPL-MCNC: 3.7 G/DL (ref 3.5–5)
ALBUMIN/GLOB SERPL: 0.9 (ref 1.1–2.2)
ALP SERPL-CCNC: 90 U/L (ref 45–117)
ALT SERPL-CCNC: 38 U/L (ref 12–78)
ANION GAP SERPL CALC-SCNC: 5 MMOL/L (ref 2–12)
AST SERPL-CCNC: 27 U/L (ref 15–37)
BILIRUB SERPL-MCNC: 0.6 MG/DL (ref 0.2–1)
BUN SERPL-MCNC: 23 MG/DL (ref 6–20)
BUN/CREAT SERPL: 18 (ref 12–20)
CALCIUM SERPL-MCNC: 9.2 MG/DL (ref 8.5–10.1)
CHLORIDE SERPL-SCNC: 105 MMOL/L (ref 97–108)
CHOLEST SERPL-MCNC: 141 MG/DL
CO2 SERPL-SCNC: 27 MMOL/L (ref 21–32)
CREAT SERPL-MCNC: 1.29 MG/DL (ref 0.55–1.02)
ERYTHROCYTE [DISTWIDTH] IN BLOOD BY AUTOMATED COUNT: 13.2 % (ref 11.5–14.5)
EST. AVERAGE GLUCOSE BLD GHB EST-MCNC: 108 MG/DL
GLOBULIN SER CALC-MCNC: 3.9 G/DL (ref 2–4)
GLUCOSE SERPL-MCNC: 89 MG/DL (ref 65–100)
HBA1C MFR BLD: 5.4 % (ref 4–5.6)
HCT VFR BLD AUTO: 44.1 % (ref 35–47)
HDLC SERPL-MCNC: 59 MG/DL
HDLC SERPL: 2.4 (ref 0–5)
HGB BLD-MCNC: 13.7 G/DL (ref 11.5–16)
LDLC SERPL CALC-MCNC: 71.8 MG/DL (ref 0–100)
MCH RBC QN AUTO: 27.3 PG (ref 26–34)
MCHC RBC AUTO-ENTMCNC: 31.1 G/DL (ref 30–36.5)
MCV RBC AUTO: 88 FL (ref 80–99)
NRBC # BLD: 0 K/UL (ref 0–0.01)
NRBC BLD-RTO: 0 PER 100 WBC
PLATELET # BLD AUTO: 220 K/UL (ref 150–400)
PMV BLD AUTO: 12.3 FL (ref 8.9–12.9)
POTASSIUM SERPL-SCNC: 4.6 MMOL/L (ref 3.5–5.1)
PROT SERPL-MCNC: 7.6 G/DL (ref 6.4–8.2)
RBC # BLD AUTO: 5.01 M/UL (ref 3.8–5.2)
SODIUM SERPL-SCNC: 137 MMOL/L (ref 136–145)
TRIGL SERPL-MCNC: 51 MG/DL
VLDLC SERPL CALC-MCNC: 10.2 MG/DL
WBC # BLD AUTO: 4 K/UL (ref 3.6–11)

## 2025-08-01 ENCOUNTER — HOSPITAL ENCOUNTER (INPATIENT)
Facility: HOSPITAL | Age: 75
LOS: 5 days | Discharge: NURSING, REHAB OR LONG-TERM CARE | DRG: 064 | End: 2025-08-06
Attending: INTERNAL MEDICINE | Admitting: INTERNAL MEDICINE
Payer: MEDICARE

## 2025-08-01 ENCOUNTER — APPOINTMENT (OUTPATIENT)
Facility: HOSPITAL | Age: 75
End: 2025-08-01
Payer: MEDICARE

## 2025-08-01 ENCOUNTER — HOSPITAL ENCOUNTER (EMERGENCY)
Facility: HOSPITAL | Age: 75
Discharge: ANOTHER HOSPITAL | End: 2025-08-01
Attending: EMERGENCY MEDICINE
Payer: MEDICARE

## 2025-08-01 ENCOUNTER — APPOINTMENT (OUTPATIENT)
Facility: HOSPITAL | Age: 75
DRG: 064 | End: 2025-08-01
Attending: INTERNAL MEDICINE
Payer: MEDICARE

## 2025-08-01 VITALS
SYSTOLIC BLOOD PRESSURE: 132 MMHG | RESPIRATION RATE: 20 BRPM | BODY MASS INDEX: 34.37 KG/M2 | HEIGHT: 67 IN | TEMPERATURE: 98.8 F | HEART RATE: 76 BPM | WEIGHT: 219 LBS | DIASTOLIC BLOOD PRESSURE: 51 MMHG | OXYGEN SATURATION: 100 %

## 2025-08-01 DIAGNOSIS — I63.9 CEREBROVASCULAR ACCIDENT (CVA), UNSPECIFIED MECHANISM (HCC): Primary | ICD-10-CM

## 2025-08-01 DIAGNOSIS — I62.9 INTRACRANIAL HEMORRHAGE (HCC): ICD-10-CM

## 2025-08-01 DIAGNOSIS — I63.9 ACUTE ISCHEMIC STROKE (HCC): Primary | ICD-10-CM

## 2025-08-01 LAB
ALBUMIN SERPL-MCNC: 3.7 G/DL (ref 3.5–5)
ALBUMIN/GLOB SERPL: 0.9 (ref 1.1–2.2)
ALP SERPL-CCNC: 90 U/L (ref 45–117)
ALT SERPL-CCNC: 28 U/L (ref 12–78)
ANION GAP SERPL CALC-SCNC: 8 MMOL/L (ref 2–12)
APTT PPP: 25.1 SEC (ref 21.2–34.1)
AST SERPL W P-5'-P-CCNC: 20 U/L (ref 15–37)
BASOPHILS # BLD: 0.01 K/UL (ref 0–0.1)
BASOPHILS NFR BLD: 0.1 % (ref 0–1)
BILIRUB SERPL-MCNC: 0.9 MG/DL (ref 0.2–1)
BUN SERPL-MCNC: 12 MG/DL (ref 6–20)
BUN/CREAT SERPL: 10 (ref 12–20)
CA-I BLD-MCNC: 9.8 MG/DL (ref 8.5–10.1)
CHLORIDE SERPL-SCNC: 106 MMOL/L (ref 97–108)
CO2 SERPL-SCNC: 26 MMOL/L (ref 21–32)
CREAT SERPL-MCNC: 1.25 MG/DL (ref 0.55–1.02)
DIFFERENTIAL METHOD BLD: ABNORMAL
EKG ATRIAL RATE: 82 BPM
EKG DIAGNOSIS: NORMAL
EKG Q-T INTERVAL: 354 MS
EKG QRS DURATION: 78 MS
EKG QTC CALCULATION (BAZETT): 413 MS
EKG R AXIS: 24 DEGREES
EKG T AXIS: -39 DEGREES
EKG VENTRICULAR RATE: 82 BPM
EOSINOPHIL # BLD: 0 K/UL (ref 0–0.4)
EOSINOPHIL NFR BLD: 0 % (ref 0–7)
ERYTHROCYTE [DISTWIDTH] IN BLOOD BY AUTOMATED COUNT: 13.1 % (ref 11.5–14.5)
GLOBULIN SER CALC-MCNC: 3.9 G/DL (ref 2–4)
GLUCOSE SERPL-MCNC: 115 MG/DL (ref 65–100)
HCT VFR BLD AUTO: 40.5 % (ref 35–47)
HGB BLD-MCNC: 13.3 G/DL (ref 11.5–16)
IMM GRANULOCYTES # BLD AUTO: 0.03 K/UL (ref 0–0.04)
IMM GRANULOCYTES NFR BLD AUTO: 0.4 % (ref 0–0.5)
INR PPP: 1.1 (ref 0.9–1.1)
LYMPHOCYTES # BLD: 0.87 K/UL (ref 0.8–3.5)
LYMPHOCYTES NFR BLD: 12.4 % (ref 12–49)
MAGNESIUM SERPL-MCNC: 2.3 MG/DL (ref 1.6–2.4)
MCH RBC QN AUTO: 27.4 PG (ref 26–34)
MCHC RBC AUTO-ENTMCNC: 32.8 G/DL (ref 30–36.5)
MCV RBC AUTO: 83.5 FL (ref 80–99)
MONOCYTES # BLD: 0.34 K/UL (ref 0–1)
MONOCYTES NFR BLD: 4.8 % (ref 5–13)
NEUTS SEG # BLD: 5.79 K/UL (ref 1.8–8)
NEUTS SEG NFR BLD: 82.3 % (ref 32–75)
NRBC # BLD: 0 K/UL (ref 0–0.01)
NRBC BLD-RTO: 0 PER 100 WBC
PLATELET # BLD AUTO: 221 K/UL (ref 150–400)
PMV BLD AUTO: 11.9 FL (ref 8.9–12.9)
POTASSIUM SERPL-SCNC: 4.4 MMOL/L (ref 3.5–5.1)
PROT SERPL-MCNC: 7.6 G/DL (ref 6.4–8.2)
PROTHROMBIN TIME: 13.9 SEC (ref 11.9–14.1)
RBC # BLD AUTO: 4.85 M/UL (ref 3.8–5.2)
SODIUM SERPL-SCNC: 140 MMOL/L (ref 136–145)
THERAPEUTIC RANGE: NORMAL SEC (ref 82–109)
TROPONIN I SERPL HS-MCNC: 11 NG/L (ref 0–51)
WBC # BLD AUTO: 7 K/UL (ref 3.6–11)

## 2025-08-01 PROCEDURE — 93005 ELECTROCARDIOGRAM TRACING: CPT | Performed by: EMERGENCY MEDICINE

## 2025-08-01 PROCEDURE — 70498 CT ANGIOGRAPHY NECK: CPT

## 2025-08-01 PROCEDURE — 96365 THER/PROPH/DIAG IV INF INIT: CPT

## 2025-08-01 PROCEDURE — 70450 CT HEAD/BRAIN W/O DYE: CPT

## 2025-08-01 PROCEDURE — APPNB45 APP NON BILLABLE 31-45 MINUTES

## 2025-08-01 PROCEDURE — 80053 COMPREHEN METABOLIC PANEL: CPT

## 2025-08-01 PROCEDURE — 99285 EMERGENCY DEPT VISIT HI MDM: CPT

## 2025-08-01 PROCEDURE — 05HA33Z INSERTION OF INFUSION DEVICE INTO LEFT BRACHIAL VEIN, PERCUTANEOUS APPROACH: ICD-10-PCS | Performed by: FAMILY MEDICINE

## 2025-08-01 PROCEDURE — 2580000003 HC RX 258: Performed by: EMERGENCY MEDICINE

## 2025-08-01 PROCEDURE — 85730 THROMBOPLASTIN TIME PARTIAL: CPT

## 2025-08-01 PROCEDURE — 80048 BASIC METABOLIC PNL TOTAL CA: CPT

## 2025-08-01 PROCEDURE — 6360000004 HC RX CONTRAST MEDICATION: Performed by: EMERGENCY MEDICINE

## 2025-08-01 PROCEDURE — 2000000000 HC ICU R&B

## 2025-08-01 PROCEDURE — 84484 ASSAY OF TROPONIN QUANT: CPT

## 2025-08-01 PROCEDURE — 2500000003 HC RX 250 WO HCPCS: Performed by: NURSE PRACTITIONER

## 2025-08-01 PROCEDURE — 85025 COMPLETE CBC W/AUTO DIFF WBC: CPT

## 2025-08-01 PROCEDURE — 96366 THER/PROPH/DIAG IV INF ADDON: CPT

## 2025-08-01 PROCEDURE — 2580000003 HC RX 258: Performed by: NURSE PRACTITIONER

## 2025-08-01 PROCEDURE — 6360000002 HC RX W HCPCS: Performed by: EMERGENCY MEDICINE

## 2025-08-01 PROCEDURE — 85610 PROTHROMBIN TIME: CPT

## 2025-08-01 PROCEDURE — 83735 ASSAY OF MAGNESIUM: CPT

## 2025-08-01 RX ORDER — SODIUM CHLORIDE 9 MG/ML
INJECTION, SOLUTION INTRAVENOUS PRN
Status: DISCONTINUED | OUTPATIENT
Start: 2025-08-01 | End: 2025-08-06 | Stop reason: HOSPADM

## 2025-08-01 RX ORDER — SODIUM CHLORIDE 0.9 % (FLUSH) 0.9 %
5-40 SYRINGE (ML) INJECTION EVERY 12 HOURS SCHEDULED
Status: DISCONTINUED | OUTPATIENT
Start: 2025-08-01 | End: 2025-08-06 | Stop reason: HOSPADM

## 2025-08-01 RX ORDER — SODIUM CHLORIDE 0.9 % (FLUSH) 0.9 %
5-40 SYRINGE (ML) INJECTION PRN
Status: DISCONTINUED | OUTPATIENT
Start: 2025-08-01 | End: 2025-08-06 | Stop reason: HOSPADM

## 2025-08-01 RX ORDER — SODIUM CHLORIDE 0.9 % (FLUSH) 0.9 %
5-40 SYRINGE (ML) INJECTION PRN
Status: CANCELLED | OUTPATIENT
Start: 2025-08-01

## 2025-08-01 RX ORDER — ONDANSETRON 2 MG/ML
4 INJECTION INTRAMUSCULAR; INTRAVENOUS EVERY 6 HOURS PRN
Status: CANCELLED | OUTPATIENT
Start: 2025-08-01

## 2025-08-01 RX ORDER — 3% SODIUM CHLORIDE 3 G/100ML
150 INJECTION, SOLUTION INTRAVENOUS CONTINUOUS
Status: DISPENSED | OUTPATIENT
Start: 2025-08-02 | End: 2025-08-02

## 2025-08-01 RX ORDER — ONDANSETRON 2 MG/ML
4 INJECTION INTRAMUSCULAR; INTRAVENOUS EVERY 6 HOURS PRN
Status: DISCONTINUED | OUTPATIENT
Start: 2025-08-01 | End: 2025-08-06 | Stop reason: HOSPADM

## 2025-08-01 RX ORDER — POLYETHYLENE GLYCOL 3350 17 G/17G
17 POWDER, FOR SOLUTION ORAL DAILY PRN
Status: DISCONTINUED | OUTPATIENT
Start: 2025-08-01 | End: 2025-08-06 | Stop reason: HOSPADM

## 2025-08-01 RX ORDER — SODIUM CHLORIDE 0.9 % (FLUSH) 0.9 %
5-40 SYRINGE (ML) INJECTION EVERY 12 HOURS SCHEDULED
Status: CANCELLED | OUTPATIENT
Start: 2025-08-01

## 2025-08-01 RX ORDER — LABETALOL HYDROCHLORIDE 5 MG/ML
10 INJECTION, SOLUTION INTRAVENOUS EVERY 4 HOURS PRN
Status: CANCELLED | OUTPATIENT
Start: 2025-08-01

## 2025-08-01 RX ORDER — SODIUM CHLORIDE 9 MG/ML
INJECTION, SOLUTION INTRAVENOUS PRN
Status: CANCELLED | OUTPATIENT
Start: 2025-08-01

## 2025-08-01 RX ORDER — IOPAMIDOL 755 MG/ML
100 INJECTION, SOLUTION INTRAVASCULAR
Status: COMPLETED | OUTPATIENT
Start: 2025-08-01 | End: 2025-08-01

## 2025-08-01 RX ORDER — ONDANSETRON 4 MG/1
4 TABLET, ORALLY DISINTEGRATING ORAL EVERY 8 HOURS PRN
Status: DISCONTINUED | OUTPATIENT
Start: 2025-08-01 | End: 2025-08-06 | Stop reason: HOSPADM

## 2025-08-01 RX ORDER — ACETAMINOPHEN 325 MG/1
650 TABLET ORAL EVERY 6 HOURS PRN
Status: CANCELLED | OUTPATIENT
Start: 2025-08-01

## 2025-08-01 RX ORDER — ACETAMINOPHEN 650 MG/1
650 SUPPOSITORY RECTAL EVERY 6 HOURS PRN
Status: CANCELLED | OUTPATIENT
Start: 2025-08-01

## 2025-08-01 RX ORDER — SODIUM CHLORIDE 9 MG/ML
INJECTION, SOLUTION INTRAVENOUS CONTINUOUS
Status: DISCONTINUED | OUTPATIENT
Start: 2025-08-01 | End: 2025-08-02

## 2025-08-01 RX ADMIN — NICARDIPINE HYDROCHLORIDE 5 MG/HR: 2.5 INJECTION, SOLUTION INTRAVENOUS at 12:44

## 2025-08-01 RX ADMIN — SODIUM CHLORIDE 150 ML: 3 INJECTION, SOLUTION INTRAVENOUS at 23:50

## 2025-08-01 RX ADMIN — SODIUM CHLORIDE: 0.9 INJECTION, SOLUTION INTRAVENOUS at 22:33

## 2025-08-01 RX ADMIN — IOPAMIDOL 100 ML: 755 INJECTION, SOLUTION INTRAVENOUS at 12:46

## 2025-08-01 RX ADMIN — SODIUM CHLORIDE, PRESERVATIVE FREE 10 ML: 5 INJECTION INTRAVENOUS at 22:33

## 2025-08-01 RX ADMIN — NICARDIPINE HYDROCHLORIDE 5 MG/HR: 2.5 INJECTION, SOLUTION INTRAVENOUS at 17:30

## 2025-08-01 ASSESSMENT — PAIN DESCRIPTION - ORIENTATION
ORIENTATION: LEFT
ORIENTATION: LEFT

## 2025-08-01 ASSESSMENT — PAIN SCALES - GENERAL
PAINLEVEL_OUTOF10: 8
PAINLEVEL_OUTOF10: 5

## 2025-08-01 ASSESSMENT — PAIN SCALES - WONG BAKER: WONGBAKER_NUMERICALRESPONSE: NO HURT

## 2025-08-01 ASSESSMENT — PAIN DESCRIPTION - PAIN TYPE: TYPE: ACUTE PAIN

## 2025-08-01 ASSESSMENT — PAIN DESCRIPTION - LOCATION
LOCATION: HEAD
LOCATION: HEAD

## 2025-08-01 ASSESSMENT — PAIN DESCRIPTION - DESCRIPTORS
DESCRIPTORS: ACHING
DESCRIPTORS: ACHING

## 2025-08-01 NOTE — ED PROVIDER NOTES
[facility] EMERGENCY DEPT  EMERGENCY DEPARTMENT HISTORY AND PHYSICAL EXAM      Date of evaluation: 8/1/2025  Patient Name: Aisha Muhammad  Birthdate 1950  MRN: [id number]  ED Provider: Yasir Barba DO   Note Started: 12:25 PM EDT 8/1/25    HISTORY OF PRESENT ILLNESS     Chief Complaint   Patient presents with    Stroke     History Provided By: EMS    HPI: 74 female with history of vertigo, hypertension, CKD, migraines, and renal insufficiency who presents with right arm weakness, confusion, and slurred speech. Onset was at 11:30 am today. She was at a  center. No falls or injuries. She suddenly started having abnormal speech. Ems reports bs 119. No anticoagulants or antiplatelets.     PAST MEDICAL HISTORY   Past Medical History:  Past Medical History:   Diagnosis Date    Acute onset of severe vertigo 12/9/2016 9-21-16 [facility] ER: labs, cardiac workup and brain MRI normal; dx w/ inner ear; resolved    Advance care planning 12/8/2015    Advance care planning 12/8/2015    Initial ACP discussion. AMD form reviewed and copy provided. 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    Benign essential hypertension 12/15/2017    Candidal intertrigo 12/9/2016    Dermatologist in [facility], Dr Grabiel Helm    Cataract, right eye 12/29/2011    CKD (chronic kidney disease) stage 3, GFR 30-59 ml/min ([facility]) 1/2/2017    Referred to nephrology 1-2017    FH: breast cancer 8/16/2010    Fibrocystic breast 8/16/2010    Lipoma of breast 12/29/2011    Migraine without aura 12/9/2016    In her 30's: triggered by cheese, cow's milk, certain sweets/chocolate    Postmenopause 8/16/2010    Renal insufficiency 8/16/2010    Retinal hemorrhage, right eye 8/16/2010    S/P colonoscopy 11/9/2010    [place]; F/U 3 yrs recommended d/t poor study/incomplete; had Barium Enema study instead which was normal; Colonoscopy 3/2014 Dr Barker, polyp    Sleep related leg cramps 12/29/2011    Vertigo

## 2025-08-01 NOTE — PROGRESS NOTES
NIS brief note    CTH/A reviewed  Large completed L temp-occipital stroke with some hemorrhagic conversion  Distal L m2 occlusion      Not an interventional candidate

## 2025-08-01 NOTE — ED NOTES
BG for EMS was 119    After speaking with family.   Pt was showing signs of confusion and unsteady gait yesterday afternoon.  Family was c/f her so she spent the night with there.    Pt was not able to open or properly operate her phone yesterday.  Was showing shuffled gait and confused speech.    Today around 11:30 or 11:50, pt developed expressive aphagia, right sided weakness, and slurred speech.     Code stroke lvl one was Paged by EMS

## 2025-08-01 NOTE — ED NOTES
Report given to [place]'s Critical care Team at bedside   Transfer JUAN M signed, copies made and filed. Original sent with Pt

## 2025-08-02 PROBLEM — I63.9 CEREBROVASCULAR ACCIDENT (CVA) (HCC): Status: ACTIVE | Noted: 2025-08-02

## 2025-08-02 LAB
ANION GAP SERPL CALC-SCNC: 11 MMOL/L (ref 2–14)
ANION GAP SERPL CALC-SCNC: 11 MMOL/L (ref 2–14)
ANION GAP SERPL CALC-SCNC: 12 MMOL/L (ref 2–14)
ANION GAP SERPL CALC-SCNC: 12 MMOL/L (ref 2–14)
ANION GAP SERPL CALC-SCNC: 13 MMOL/L (ref 2–14)
ANION GAP SERPL CALC-SCNC: 13 MMOL/L (ref 2–14)
BUN SERPL-MCNC: 11 MG/DL (ref 8–23)
BUN SERPL-MCNC: 12 MG/DL (ref 8–23)
BUN SERPL-MCNC: 12 MG/DL (ref 8–23)
BUN/CREAT SERPL: 11 (ref 12–20)
BUN/CREAT SERPL: 12 (ref 12–20)
BUN/CREAT SERPL: 12 (ref 12–20)
BUN/CREAT SERPL: 13 (ref 12–20)
BUN/CREAT SERPL: 13 (ref 12–20)
CALCIUM SERPL-MCNC: 8.8 MG/DL (ref 8.8–10.2)
CALCIUM SERPL-MCNC: 9 MG/DL (ref 8.8–10.2)
CALCIUM SERPL-MCNC: 9 MG/DL (ref 8.8–10.2)
CALCIUM SERPL-MCNC: 9.1 MG/DL (ref 8.8–10.2)
CALCIUM SERPL-MCNC: 9.2 MG/DL (ref 8.8–10.2)
CALCIUM SERPL-MCNC: 9.4 MG/DL (ref 8.8–10.2)
CHLORIDE SERPL-SCNC: 104 MMOL/L (ref 98–107)
CHLORIDE SERPL-SCNC: 105 MMOL/L (ref 98–107)
CHLORIDE SERPL-SCNC: 107 MMOL/L (ref 98–107)
CHLORIDE SERPL-SCNC: 110 MMOL/L (ref 98–107)
CHLORIDE SERPL-SCNC: 111 MMOL/L (ref 98–107)
CHLORIDE SERPL-SCNC: 115 MMOL/L (ref 98–107)
CHOLEST SERPL-MCNC: 134 MG/DL (ref 0–200)
CO2 SERPL-SCNC: 18 MMOL/L (ref 20–29)
CO2 SERPL-SCNC: 19 MMOL/L (ref 20–29)
CO2 SERPL-SCNC: 21 MMOL/L (ref 20–29)
CO2 SERPL-SCNC: 23 MMOL/L (ref 20–29)
CREAT SERPL-MCNC: 0.84 MG/DL (ref 0.6–1)
CREAT SERPL-MCNC: 0.87 MG/DL (ref 0.6–1)
CREAT SERPL-MCNC: 0.92 MG/DL (ref 0.6–1)
CREAT SERPL-MCNC: 0.93 MG/DL (ref 0.6–1)
CREAT SERPL-MCNC: 0.94 MG/DL (ref 0.6–1)
CREAT SERPL-MCNC: 1.02 MG/DL (ref 0.6–1)
ERYTHROCYTE [DISTWIDTH] IN BLOOD BY AUTOMATED COUNT: 13.2 % (ref 11.5–14.5)
EST. AVERAGE GLUCOSE BLD GHB EST-MCNC: 113 MG/DL
GLUCOSE SERPL-MCNC: 101 MG/DL (ref 65–100)
GLUCOSE SERPL-MCNC: 104 MG/DL (ref 65–100)
GLUCOSE SERPL-MCNC: 106 MG/DL (ref 65–100)
GLUCOSE SERPL-MCNC: 110 MG/DL (ref 65–100)
GLUCOSE SERPL-MCNC: 110 MG/DL (ref 65–100)
GLUCOSE SERPL-MCNC: 98 MG/DL (ref 65–100)
HBA1C MFR BLD: 5.6 % (ref 4–5.6)
HCT VFR BLD AUTO: 46.7 % (ref 35–47)
HDLC SERPL-MCNC: 60 MG/DL (ref 40–60)
HDLC SERPL: 2.2
HGB BLD-MCNC: 14.5 G/DL (ref 11.5–16)
LDLC SERPL CALC-MCNC: 63 MG/DL
MCH RBC QN AUTO: 27.2 PG (ref 26–34)
MCHC RBC AUTO-ENTMCNC: 31 G/DL (ref 30–36.5)
MCV RBC AUTO: 87.5 FL (ref 80–99)
NRBC # BLD: 0 K/UL (ref 0–0.01)
NRBC BLD-RTO: 0 PER 100 WBC
PLATELET # BLD AUTO: 195 K/UL (ref 150–400)
PMV BLD AUTO: 11.3 FL (ref 8.9–12.9)
POTASSIUM SERPL-SCNC: 3.8 MMOL/L (ref 3.5–5.1)
POTASSIUM SERPL-SCNC: 3.9 MMOL/L (ref 3.5–5.1)
POTASSIUM SERPL-SCNC: 4.1 MMOL/L (ref 3.5–5.1)
POTASSIUM SERPL-SCNC: 4.1 MMOL/L (ref 3.5–5.1)
POTASSIUM SERPL-SCNC: 4.4 MMOL/L (ref 3.5–5.1)
POTASSIUM SERPL-SCNC: 4.8 MMOL/L (ref 3.5–5.1)
RBC # BLD AUTO: 5.34 M/UL (ref 3.8–5.2)
SODIUM SERPL-SCNC: 137 MMOL/L (ref 136–145)
SODIUM SERPL-SCNC: 138 MMOL/L (ref 136–145)
SODIUM SERPL-SCNC: 139 MMOL/L (ref 136–145)
SODIUM SERPL-SCNC: 142 MMOL/L (ref 136–145)
SODIUM SERPL-SCNC: 144 MMOL/L (ref 136–145)
SODIUM SERPL-SCNC: 147 MMOL/L (ref 136–145)
T4 FREE SERPL-MCNC: 1 NG/DL (ref 0.9–1.6)
TRIGL SERPL-MCNC: 52 MG/DL (ref 0–150)
TROPONIN T SERPL HS-MCNC: 15.3 NG/L (ref 0–14)
TSH, 3RD GENERATION: 1.73 UIU/ML (ref 0.27–4.2)
VLDLC SERPL CALC-MCNC: 10 MG/DL
WBC # BLD AUTO: 7.4 K/UL (ref 3.6–11)

## 2025-08-02 PROCEDURE — 97112 NEUROMUSCULAR REEDUCATION: CPT

## 2025-08-02 PROCEDURE — 84439 ASSAY OF FREE THYROXINE: CPT

## 2025-08-02 PROCEDURE — 6360000002 HC RX W HCPCS: Performed by: NURSE PRACTITIONER

## 2025-08-02 PROCEDURE — 83036 HEMOGLOBIN GLYCOSYLATED A1C: CPT

## 2025-08-02 PROCEDURE — 97116 GAIT TRAINING THERAPY: CPT

## 2025-08-02 PROCEDURE — 84484 ASSAY OF TROPONIN QUANT: CPT

## 2025-08-02 PROCEDURE — 99223 1ST HOSP IP/OBS HIGH 75: CPT | Performed by: STUDENT IN AN ORGANIZED HEALTH CARE EDUCATION/TRAINING PROGRAM

## 2025-08-02 PROCEDURE — 36410 VNPNXR 3YR/> PHY/QHP DX/THER: CPT

## 2025-08-02 PROCEDURE — 99222 1ST HOSP IP/OBS MODERATE 55: CPT

## 2025-08-02 PROCEDURE — 85027 COMPLETE CBC AUTOMATED: CPT

## 2025-08-02 PROCEDURE — 6360000002 HC RX W HCPCS: Performed by: INTERNAL MEDICINE

## 2025-08-02 PROCEDURE — 2500000003 HC RX 250 WO HCPCS: Performed by: NURSE PRACTITIONER

## 2025-08-02 PROCEDURE — 36415 COLL VENOUS BLD VENIPUNCTURE: CPT

## 2025-08-02 PROCEDURE — 97161 PT EVAL LOW COMPLEX 20 MIN: CPT

## 2025-08-02 PROCEDURE — 94760 N-INVAS EAR/PLS OXIMETRY 1: CPT

## 2025-08-02 PROCEDURE — 2580000003 HC RX 258: Performed by: INTERNAL MEDICINE

## 2025-08-02 PROCEDURE — 2580000003 HC RX 258: Performed by: NURSE PRACTITIONER

## 2025-08-02 PROCEDURE — 80061 LIPID PANEL: CPT

## 2025-08-02 PROCEDURE — 2000000000 HC ICU R&B

## 2025-08-02 PROCEDURE — 84443 ASSAY THYROID STIM HORMONE: CPT

## 2025-08-02 PROCEDURE — 6370000000 HC RX 637 (ALT 250 FOR IP): Performed by: INTERNAL MEDICINE

## 2025-08-02 PROCEDURE — 97166 OT EVAL MOD COMPLEX 45 MIN: CPT

## 2025-08-02 PROCEDURE — 80048 BASIC METABOLIC PNL TOTAL CA: CPT

## 2025-08-02 RX ORDER — DEXTROSE MONOHYDRATE 100 MG/ML
INJECTION, SOLUTION INTRAVENOUS CONTINUOUS PRN
Status: DISCONTINUED | OUTPATIENT
Start: 2025-08-02 | End: 2025-08-06 | Stop reason: HOSPADM

## 2025-08-02 RX ORDER — 3% SODIUM CHLORIDE 3 G/100ML
150 INJECTION, SOLUTION INTRAVENOUS CONTINUOUS
Status: DISPENSED | OUTPATIENT
Start: 2025-08-02 | End: 2025-08-02

## 2025-08-02 RX ORDER — LABETALOL HYDROCHLORIDE 5 MG/ML
10 INJECTION, SOLUTION INTRAVENOUS EVERY 6 HOURS PRN
Status: DISCONTINUED | OUTPATIENT
Start: 2025-08-02 | End: 2025-08-04

## 2025-08-02 RX ORDER — ACETAMINOPHEN 650 MG/1
650 SUPPOSITORY RECTAL EVERY 4 HOURS PRN
Status: COMPLETED | OUTPATIENT
Start: 2025-08-02 | End: 2025-08-02

## 2025-08-02 RX ORDER — HYDRALAZINE HYDROCHLORIDE 20 MG/ML
10 INJECTION INTRAMUSCULAR; INTRAVENOUS EVERY 4 HOURS PRN
Status: DISCONTINUED | OUTPATIENT
Start: 2025-08-02 | End: 2025-08-02

## 2025-08-02 RX ORDER — LABETALOL HYDROCHLORIDE 5 MG/ML
10 INJECTION, SOLUTION INTRAVENOUS EVERY 6 HOURS PRN
Status: DISCONTINUED | OUTPATIENT
Start: 2025-08-02 | End: 2025-08-02

## 2025-08-02 RX ORDER — 3% SODIUM CHLORIDE 3 G/100ML
50 INJECTION, SOLUTION INTRAVENOUS CONTINUOUS
Status: DISPENSED | OUTPATIENT
Start: 2025-08-02 | End: 2025-08-03

## 2025-08-02 RX ORDER — ROSUVASTATIN CALCIUM 40 MG/1
40 TABLET, COATED ORAL NIGHTLY
Status: DISCONTINUED | OUTPATIENT
Start: 2025-08-02 | End: 2025-08-06 | Stop reason: HOSPADM

## 2025-08-02 RX ORDER — HYDRALAZINE HYDROCHLORIDE 20 MG/ML
10 INJECTION INTRAMUSCULAR; INTRAVENOUS EVERY 4 HOURS PRN
Status: DISCONTINUED | OUTPATIENT
Start: 2025-08-02 | End: 2025-08-04

## 2025-08-02 RX ORDER — 3% SODIUM CHLORIDE 3 G/100ML
30 INJECTION, SOLUTION INTRAVENOUS CONTINUOUS
Status: DISCONTINUED | OUTPATIENT
Start: 2025-08-02 | End: 2025-08-02

## 2025-08-02 RX ADMIN — LABETALOL HYDROCHLORIDE 10 MG: 5 INJECTION, SOLUTION INTRAVENOUS at 14:12

## 2025-08-02 RX ADMIN — SODIUM CHLORIDE 30 ML/HR: 3 INJECTION, SOLUTION INTRAVENOUS at 07:23

## 2025-08-02 RX ADMIN — NICARDIPINE HYDROCHLORIDE 5 MG/HR: 2.5 INJECTION, SOLUTION INTRAVENOUS at 16:17

## 2025-08-02 RX ADMIN — ACETAMINOPHEN 650 MG: 650 SUPPOSITORY RECTAL at 19:22

## 2025-08-02 RX ADMIN — SODIUM CHLORIDE 150 ML: 3 INJECTION, SOLUTION INTRAVENOUS at 05:22

## 2025-08-02 RX ADMIN — SODIUM CHLORIDE, PRESERVATIVE FREE 10 ML: 5 INJECTION INTRAVENOUS at 20:08

## 2025-08-02 RX ADMIN — SODIUM CHLORIDE 50 ML/HR: 3 INJECTION, SOLUTION INTRAVENOUS at 18:53

## 2025-08-02 RX ADMIN — NICARDIPINE HYDROCHLORIDE 7.5 MG/HR: 2.5 INJECTION, SOLUTION INTRAVENOUS at 08:58

## 2025-08-02 RX ADMIN — HYDRALAZINE HYDROCHLORIDE 10 MG: 20 INJECTION, SOLUTION INTRAMUSCULAR; INTRAVENOUS at 18:09

## 2025-08-02 RX ADMIN — NICARDIPINE HYDROCHLORIDE 0.75 MG/HR: 2.5 INJECTION, SOLUTION INTRAVENOUS at 12:43

## 2025-08-02 RX ADMIN — NICARDIPINE HYDROCHLORIDE 7.5 MG/HR: 2.5 INJECTION, SOLUTION INTRAVENOUS at 20:11

## 2025-08-02 RX ADMIN — NICARDIPINE HYDROCHLORIDE 5 MG/HR: 2.5 INJECTION, SOLUTION INTRAVENOUS at 06:28

## 2025-08-02 RX ADMIN — LABETALOL HYDROCHLORIDE 10 MG: 5 INJECTION, SOLUTION INTRAVENOUS at 18:57

## 2025-08-02 ASSESSMENT — PAIN SCALES - WONG BAKER
WONGBAKER_NUMERICALRESPONSE: NO HURT

## 2025-08-02 ASSESSMENT — PAIN SCALES - GENERAL
PAINLEVEL_OUTOF10: 0

## 2025-08-03 ENCOUNTER — APPOINTMENT (OUTPATIENT)
Facility: HOSPITAL | Age: 75
DRG: 064 | End: 2025-08-03
Attending: INTERNAL MEDICINE
Payer: MEDICARE

## 2025-08-03 ENCOUNTER — TELEPHONE (OUTPATIENT)
Facility: HOSPITAL | Age: 75
End: 2025-08-03

## 2025-08-03 PROBLEM — I63.9 ACUTE ISCHEMIC STROKE (HCC): Status: ACTIVE | Noted: 2025-08-03

## 2025-08-03 LAB
ANION GAP SERPL CALC-SCNC: 10 MMOL/L (ref 2–14)
ANION GAP SERPL CALC-SCNC: 11 MMOL/L (ref 2–14)
BASOPHILS # BLD: 0.02 K/UL (ref 0–0.1)
BASOPHILS NFR BLD: 0.3 % (ref 0–1)
BUN SERPL-MCNC: 12 MG/DL (ref 8–23)
BUN SERPL-MCNC: 12 MG/DL (ref 8–23)
BUN/CREAT SERPL: 14 (ref 12–20)
BUN/CREAT SERPL: 14 (ref 12–20)
CALCIUM SERPL-MCNC: 8.7 MG/DL (ref 8.8–10.2)
CALCIUM SERPL-MCNC: 9.2 MG/DL (ref 8.8–10.2)
CHLORIDE SERPL-SCNC: 113 MMOL/L (ref 98–107)
CHLORIDE SERPL-SCNC: 116 MMOL/L (ref 98–107)
CO2 SERPL-SCNC: 20 MMOL/L (ref 20–29)
CO2 SERPL-SCNC: 21 MMOL/L (ref 20–29)
CREAT SERPL-MCNC: 0.83 MG/DL (ref 0.6–1)
CREAT SERPL-MCNC: 0.87 MG/DL (ref 0.6–1)
DIFFERENTIAL METHOD BLD: NORMAL
ECHO AO ROOT DIAM: 3.2 CM
ECHO AO ROOT INDEX: 1.52 CM/M2
ECHO AV AREA PEAK VELOCITY: 2.4 CM2
ECHO AV AREA VTI: 2.3 CM2
ECHO AV AREA/BSA PEAK VELOCITY: 1.1 CM2/M2
ECHO AV AREA/BSA VTI: 1.1 CM2/M2
ECHO AV MEAN GRADIENT: 8 MMHG
ECHO AV MEAN VELOCITY: 1.4 M/S
ECHO AV PEAK GRADIENT: 15 MMHG
ECHO AV PEAK VELOCITY: 2 M/S
ECHO AV VELOCITY RATIO: 0.75
ECHO AV VTI: 40 CM
ECHO BSA: 2.17 M2
ECHO EST RA PRESSURE: 5 MMHG
ECHO LA DIAMETER INDEX: 1.61 CM/M2
ECHO LA DIAMETER: 3.4 CM
ECHO LA TO AORTIC ROOT RATIO: 1.06
ECHO LV EF PHYSICIAN: 65 %
ECHO LV FRACTIONAL SHORTENING: 33 % (ref 28–44)
ECHO LV INTERNAL DIMENSION DIASTOLE INDEX: 2.46 CM/M2
ECHO LV INTERNAL DIMENSION DIASTOLIC: 5.2 CM (ref 3.9–5.3)
ECHO LV INTERNAL DIMENSION SYSTOLIC INDEX: 1.66 CM/M2
ECHO LV INTERNAL DIMENSION SYSTOLIC: 3.5 CM
ECHO LV IVSD: 1 CM (ref 0.6–0.9)
ECHO LV MASS 2D: 181.4 G (ref 67–162)
ECHO LV MASS INDEX 2D: 86 G/M2 (ref 43–95)
ECHO LV POSTERIOR WALL DIASTOLIC: 0.9 CM (ref 0.6–0.9)
ECHO LV RELATIVE WALL THICKNESS RATIO: 0.35
ECHO LVOT AREA: 3.1 CM2
ECHO LVOT AV VTI INDEX: 0.71
ECHO LVOT DIAM: 2 CM
ECHO LVOT MEAN GRADIENT: 5 MMHG
ECHO LVOT PEAK GRADIENT: 9 MMHG
ECHO LVOT PEAK VELOCITY: 1.5 M/S
ECHO LVOT STROKE VOLUME INDEX: 42.1 ML/M2
ECHO LVOT SV: 88.9 ML
ECHO LVOT VTI: 28.3 CM
ECHO MV A VELOCITY: 0.79 M/S
ECHO MV AREA PHT: 3.3 CM2
ECHO MV AREA VTI: 3.6 CM2
ECHO MV E DECELERATION TIME (DT): 228.6 MS
ECHO MV E VELOCITY: 0.78 M/S
ECHO MV E/A RATIO: 0.99
ECHO MV LVOT VTI INDEX: 0.86
ECHO MV MAX VELOCITY: 0.8 M/S
ECHO MV MEAN GRADIENT: 1 MMHG
ECHO MV MEAN VELOCITY: 0.5 M/S
ECHO MV PEAK GRADIENT: 3 MMHG
ECHO MV PRESSURE HALF TIME (PHT): 66.3 MS
ECHO MV REGURGITANT PEAK GRADIENT: 29 MMHG
ECHO MV REGURGITANT PEAK VELOCITY: 2.7 M/S
ECHO MV VTI: 24.4 CM
ECHO RIGHT VENTRICULAR SYSTOLIC PRESSURE (RVSP): 40 MMHG
ECHO TV REGURGITANT MAX VELOCITY: 2.97 M/S
ECHO TV REGURGITANT PEAK GRADIENT: 35 MMHG
EKG ATRIAL RATE: 78 BPM
EKG DIAGNOSIS: NORMAL
EKG P AXIS: 65 DEGREES
EKG P-R INTERVAL: 252 MS
EKG Q-T INTERVAL: 386 MS
EKG QRS DURATION: 80 MS
EKG QTC CALCULATION (BAZETT): 440 MS
EKG R AXIS: 46 DEGREES
EKG T AXIS: 261 DEGREES
EKG VENTRICULAR RATE: 78 BPM
EOSINOPHIL # BLD: 0.01 K/UL (ref 0–0.4)
EOSINOPHIL NFR BLD: 0.2 % (ref 0–7)
ERYTHROCYTE [DISTWIDTH] IN BLOOD BY AUTOMATED COUNT: 13.4 % (ref 11.5–14.5)
GLUCOSE SERPL-MCNC: 104 MG/DL (ref 65–100)
GLUCOSE SERPL-MCNC: 113 MG/DL (ref 65–100)
HCT VFR BLD AUTO: 42.8 % (ref 35–47)
HGB BLD-MCNC: 13.5 G/DL (ref 11.5–16)
IMM GRANULOCYTES # BLD AUTO: 0.01 K/UL (ref 0–0.04)
IMM GRANULOCYTES NFR BLD AUTO: 0.2 % (ref 0–0.5)
LYMPHOCYTES # BLD: 1.26 K/UL (ref 0.8–3.5)
LYMPHOCYTES NFR BLD: 19.3 % (ref 12–49)
MAGNESIUM SERPL-MCNC: 2 MG/DL (ref 1.6–2.4)
MCH RBC QN AUTO: 27.4 PG (ref 26–34)
MCHC RBC AUTO-ENTMCNC: 31.5 G/DL (ref 30–36.5)
MCV RBC AUTO: 86.8 FL (ref 80–99)
MONOCYTES # BLD: 0.54 K/UL (ref 0–1)
MONOCYTES NFR BLD: 8.3 % (ref 5–13)
NEUTS SEG # BLD: 4.7 K/UL (ref 1.8–8)
NEUTS SEG NFR BLD: 71.7 % (ref 32–75)
NRBC # BLD: 0 K/UL (ref 0–0.01)
NRBC BLD-RTO: 0 PER 100 WBC
PHOSPHATE SERPL-MCNC: 2.9 MG/DL (ref 2.5–4.5)
PLATELET # BLD AUTO: 180 K/UL (ref 150–400)
PMV BLD AUTO: 11.2 FL (ref 8.9–12.9)
POTASSIUM SERPL-SCNC: 3.7 MMOL/L (ref 3.5–5.1)
POTASSIUM SERPL-SCNC: 4 MMOL/L (ref 3.5–5.1)
RBC # BLD AUTO: 4.93 M/UL (ref 3.8–5.2)
SODIUM SERPL-SCNC: 144 MMOL/L (ref 136–145)
SODIUM SERPL-SCNC: 147 MMOL/L (ref 136–145)
WBC # BLD AUTO: 6.5 K/UL (ref 3.6–11)

## 2025-08-03 PROCEDURE — 6360000002 HC RX W HCPCS: Performed by: NURSE PRACTITIONER

## 2025-08-03 PROCEDURE — 70551 MRI BRAIN STEM W/O DYE: CPT

## 2025-08-03 PROCEDURE — 92610 EVALUATE SWALLOWING FUNCTION: CPT

## 2025-08-03 PROCEDURE — 6370000000 HC RX 637 (ALT 250 FOR IP): Performed by: NURSE PRACTITIONER

## 2025-08-03 PROCEDURE — 2500000003 HC RX 250 WO HCPCS: Performed by: NURSE PRACTITIONER

## 2025-08-03 PROCEDURE — 6360000002 HC RX W HCPCS: Performed by: INTERNAL MEDICINE

## 2025-08-03 PROCEDURE — 2580000003 HC RX 258: Performed by: INTERNAL MEDICINE

## 2025-08-03 PROCEDURE — 93005 ELECTROCARDIOGRAM TRACING: CPT | Performed by: INTERNAL MEDICINE

## 2025-08-03 PROCEDURE — 83735 ASSAY OF MAGNESIUM: CPT

## 2025-08-03 PROCEDURE — 99233 SBSQ HOSP IP/OBS HIGH 50: CPT | Performed by: STUDENT IN AN ORGANIZED HEALTH CARE EDUCATION/TRAINING PROGRAM

## 2025-08-03 PROCEDURE — 85025 COMPLETE CBC W/AUTO DIFF WBC: CPT

## 2025-08-03 PROCEDURE — 92522 EVALUATE SPEECH PRODUCTION: CPT

## 2025-08-03 PROCEDURE — 84100 ASSAY OF PHOSPHORUS: CPT

## 2025-08-03 PROCEDURE — 93308 TTE F-UP OR LMTD: CPT

## 2025-08-03 PROCEDURE — 2000000000 HC ICU R&B

## 2025-08-03 PROCEDURE — 80048 BASIC METABOLIC PNL TOTAL CA: CPT

## 2025-08-03 RX ORDER — SENNA AND DOCUSATE SODIUM 50; 8.6 MG/1; MG/1
2 TABLET, FILM COATED ORAL 2 TIMES DAILY PRN
Status: DISCONTINUED | OUTPATIENT
Start: 2025-08-03 | End: 2025-08-06 | Stop reason: HOSPADM

## 2025-08-03 RX ADMIN — NICARDIPINE HYDROCHLORIDE 2.5 MG/HR: 2.5 INJECTION, SOLUTION INTRAVENOUS at 06:07

## 2025-08-03 RX ADMIN — SODIUM CHLORIDE, PRESERVATIVE FREE 10 ML: 5 INJECTION INTRAVENOUS at 09:16

## 2025-08-03 RX ADMIN — ROSUVASTATIN 40 MG: 40 TABLET, FILM COATED ORAL at 21:41

## 2025-08-03 RX ADMIN — POLYETHYLENE GLYCOL 3350 17 G: 17 POWDER, FOR SOLUTION ORAL at 21:42

## 2025-08-03 RX ADMIN — SODIUM CHLORIDE, PRESERVATIVE FREE 10 ML: 5 INJECTION INTRAVENOUS at 21:42

## 2025-08-03 RX ADMIN — NICARDIPINE HYDROCHLORIDE 7.5 MG/HR: 2.5 INJECTION, SOLUTION INTRAVENOUS at 13:53

## 2025-08-03 RX ADMIN — HYDRALAZINE HYDROCHLORIDE 10 MG: 20 INJECTION, SOLUTION INTRAMUSCULAR; INTRAVENOUS at 09:15

## 2025-08-03 ASSESSMENT — PAIN SCALES - WONG BAKER
WONGBAKER_NUMERICALRESPONSE: NO HURT
WONGBAKER_NUMERICALRESPONSE: NO HURT

## 2025-08-03 ASSESSMENT — PAIN SCALES - GENERAL
PAINLEVEL_OUTOF10: 0
PAINLEVEL_OUTOF10: 0

## 2025-08-04 LAB
ANION GAP SERPL CALC-SCNC: 11 MMOL/L (ref 2–14)
BASOPHILS # BLD: 0.02 K/UL (ref 0–0.1)
BASOPHILS NFR BLD: 0.3 % (ref 0–1)
BUN SERPL-MCNC: 12 MG/DL (ref 8–23)
BUN/CREAT SERPL: 15 (ref 12–20)
CALCIUM SERPL-MCNC: 8.7 MG/DL (ref 8.8–10.2)
CHLORIDE SERPL-SCNC: 111 MMOL/L (ref 98–107)
CO2 SERPL-SCNC: 20 MMOL/L (ref 20–29)
CREAT SERPL-MCNC: 0.8 MG/DL (ref 0.6–1)
DIFFERENTIAL METHOD BLD: NORMAL
EOSINOPHIL # BLD: 0.08 K/UL (ref 0–0.4)
EOSINOPHIL NFR BLD: 1.3 % (ref 0–7)
ERYTHROCYTE [DISTWIDTH] IN BLOOD BY AUTOMATED COUNT: 13.4 % (ref 11.5–14.5)
GLUCOSE SERPL-MCNC: 101 MG/DL (ref 65–100)
HCT VFR BLD AUTO: 43.8 % (ref 35–47)
HGB BLD-MCNC: 13.6 G/DL (ref 11.5–16)
IMM GRANULOCYTES # BLD AUTO: 0.02 K/UL (ref 0–0.04)
IMM GRANULOCYTES NFR BLD AUTO: 0.3 % (ref 0–0.5)
LYMPHOCYTES # BLD: 1.58 K/UL (ref 0.8–3.5)
LYMPHOCYTES NFR BLD: 25.9 % (ref 12–49)
MCH RBC QN AUTO: 27.2 PG (ref 26–34)
MCHC RBC AUTO-ENTMCNC: 31.1 G/DL (ref 30–36.5)
MCV RBC AUTO: 87.6 FL (ref 80–99)
MONOCYTES # BLD: 0.41 K/UL (ref 0–1)
MONOCYTES NFR BLD: 6.8 % (ref 5–13)
NEUTS SEG # BLD: 3.99 K/UL (ref 1.8–8)
NEUTS SEG NFR BLD: 65.4 % (ref 32–75)
NRBC # BLD: 0 K/UL (ref 0–0.01)
NRBC BLD-RTO: 0 PER 100 WBC
PLATELET # BLD AUTO: 187 K/UL (ref 150–400)
POTASSIUM SERPL-SCNC: 3.5 MMOL/L (ref 3.5–5.1)
RBC # BLD AUTO: 5 M/UL (ref 3.8–5.2)
RBC MORPH BLD: NORMAL
SODIUM SERPL-SCNC: 142 MMOL/L (ref 136–145)
WBC # BLD AUTO: 6.1 K/UL (ref 3.6–11)

## 2025-08-04 PROCEDURE — 92523 SPEECH SOUND LANG COMPREHEN: CPT

## 2025-08-04 PROCEDURE — 6370000000 HC RX 637 (ALT 250 FOR IP): Performed by: NURSE PRACTITIONER

## 2025-08-04 PROCEDURE — 97116 GAIT TRAINING THERAPY: CPT

## 2025-08-04 PROCEDURE — 97530 THERAPEUTIC ACTIVITIES: CPT

## 2025-08-04 PROCEDURE — 97535 SELF CARE MNGMENT TRAINING: CPT

## 2025-08-04 PROCEDURE — 85025 COMPLETE CBC W/AUTO DIFF WBC: CPT

## 2025-08-04 PROCEDURE — 80048 BASIC METABOLIC PNL TOTAL CA: CPT

## 2025-08-04 PROCEDURE — 92526 ORAL FUNCTION THERAPY: CPT

## 2025-08-04 PROCEDURE — 94760 N-INVAS EAR/PLS OXIMETRY 1: CPT

## 2025-08-04 PROCEDURE — 2500000003 HC RX 250 WO HCPCS: Performed by: NURSE PRACTITIONER

## 2025-08-04 PROCEDURE — 2060000000 HC ICU INTERMEDIATE R&B

## 2025-08-04 RX ORDER — LABETALOL HYDROCHLORIDE 5 MG/ML
10 INJECTION, SOLUTION INTRAVENOUS EVERY 6 HOURS PRN
Status: DISCONTINUED | OUTPATIENT
Start: 2025-08-04 | End: 2025-08-06 | Stop reason: HOSPADM

## 2025-08-04 RX ORDER — HYDRALAZINE HYDROCHLORIDE 20 MG/ML
10 INJECTION INTRAMUSCULAR; INTRAVENOUS EVERY 4 HOURS PRN
Status: DISCONTINUED | OUTPATIENT
Start: 2025-08-04 | End: 2025-08-06 | Stop reason: HOSPADM

## 2025-08-04 RX ORDER — POTASSIUM CHLORIDE 750 MG/1
40 TABLET, EXTENDED RELEASE ORAL ONCE
Status: COMPLETED | OUTPATIENT
Start: 2025-08-04 | End: 2025-08-04

## 2025-08-04 RX ADMIN — ROSUVASTATIN 40 MG: 40 TABLET, FILM COATED ORAL at 20:11

## 2025-08-04 RX ADMIN — POLYETHYLENE GLYCOL 3350 17 G: 17 POWDER, FOR SOLUTION ORAL at 08:57

## 2025-08-04 RX ADMIN — SODIUM CHLORIDE, PRESERVATIVE FREE 10 ML: 5 INJECTION INTRAVENOUS at 20:11

## 2025-08-04 RX ADMIN — SODIUM CHLORIDE, PRESERVATIVE FREE 10 ML: 5 INJECTION INTRAVENOUS at 08:09

## 2025-08-04 RX ADMIN — POTASSIUM CHLORIDE 40 MEQ: 750 TABLET, FILM COATED, EXTENDED RELEASE ORAL at 06:13

## 2025-08-05 LAB
ANION GAP SERPL CALC-SCNC: 12 MMOL/L (ref 2–14)
BASOPHILS # BLD: 0.03 K/UL (ref 0–0.1)
BASOPHILS NFR BLD: 0.4 % (ref 0–1)
BUN SERPL-MCNC: 12 MG/DL (ref 8–23)
BUN/CREAT SERPL: 13 (ref 12–20)
CALCIUM SERPL-MCNC: 8.9 MG/DL (ref 8.8–10.2)
CHLORIDE SERPL-SCNC: 106 MMOL/L (ref 98–107)
CO2 SERPL-SCNC: 21 MMOL/L (ref 20–29)
CREAT SERPL-MCNC: 0.88 MG/DL (ref 0.6–1)
DIFFERENTIAL METHOD BLD: NORMAL
EOSINOPHIL # BLD: 0.12 K/UL (ref 0–0.4)
EOSINOPHIL NFR BLD: 1.8 % (ref 0–7)
ERYTHROCYTE [DISTWIDTH] IN BLOOD BY AUTOMATED COUNT: 13 % (ref 11.5–14.5)
GLUCOSE SERPL-MCNC: 95 MG/DL (ref 65–100)
HCT VFR BLD AUTO: 42.1 % (ref 35–47)
HGB BLD-MCNC: 13.3 G/DL (ref 11.5–16)
IMM GRANULOCYTES # BLD AUTO: 0.02 K/UL (ref 0–0.04)
IMM GRANULOCYTES NFR BLD AUTO: 0.3 % (ref 0–0.5)
LYMPHOCYTES # BLD: 1.38 K/UL (ref 0.8–3.5)
LYMPHOCYTES NFR BLD: 20.6 % (ref 12–49)
MCH RBC QN AUTO: 27.3 PG (ref 26–34)
MCHC RBC AUTO-ENTMCNC: 31.6 G/DL (ref 30–36.5)
MCV RBC AUTO: 86.4 FL (ref 80–99)
MONOCYTES # BLD: 0.47 K/UL (ref 0–1)
MONOCYTES NFR BLD: 7 % (ref 5–13)
NEUTS SEG # BLD: 4.67 K/UL (ref 1.8–8)
NEUTS SEG NFR BLD: 69.9 % (ref 32–75)
NRBC # BLD: 0 K/UL (ref 0–0.01)
NRBC BLD-RTO: 0 PER 100 WBC
PLATELET # BLD AUTO: 199 K/UL (ref 150–400)
PMV BLD AUTO: 11.2 FL (ref 8.9–12.9)
POTASSIUM SERPL-SCNC: 4.1 MMOL/L (ref 3.5–5.1)
RBC # BLD AUTO: 4.87 M/UL (ref 3.8–5.2)
SODIUM SERPL-SCNC: 139 MMOL/L (ref 136–145)
WBC # BLD AUTO: 6.7 K/UL (ref 3.6–11)

## 2025-08-05 PROCEDURE — 97535 SELF CARE MNGMENT TRAINING: CPT

## 2025-08-05 PROCEDURE — 85025 COMPLETE CBC W/AUTO DIFF WBC: CPT

## 2025-08-05 PROCEDURE — 2060000000 HC ICU INTERMEDIATE R&B

## 2025-08-05 PROCEDURE — 94760 N-INVAS EAR/PLS OXIMETRY 1: CPT

## 2025-08-05 PROCEDURE — 2500000003 HC RX 250 WO HCPCS: Performed by: NURSE PRACTITIONER

## 2025-08-05 PROCEDURE — 80048 BASIC METABOLIC PNL TOTAL CA: CPT

## 2025-08-05 PROCEDURE — 6370000000 HC RX 637 (ALT 250 FOR IP): Performed by: NURSE PRACTITIONER

## 2025-08-05 PROCEDURE — 97530 THERAPEUTIC ACTIVITIES: CPT

## 2025-08-05 PROCEDURE — 97116 GAIT TRAINING THERAPY: CPT

## 2025-08-05 RX ADMIN — ROSUVASTATIN 40 MG: 40 TABLET, FILM COATED ORAL at 21:14

## 2025-08-05 RX ADMIN — SODIUM CHLORIDE, PRESERVATIVE FREE 10 ML: 5 INJECTION INTRAVENOUS at 08:43

## 2025-08-06 VITALS
DIASTOLIC BLOOD PRESSURE: 76 MMHG | HEART RATE: 67 BPM | BODY MASS INDEX: 36.8 KG/M2 | SYSTOLIC BLOOD PRESSURE: 141 MMHG | WEIGHT: 235.01 LBS | TEMPERATURE: 98.2 F | OXYGEN SATURATION: 96 % | RESPIRATION RATE: 12 BRPM

## 2025-08-06 LAB
ANION GAP SERPL CALC-SCNC: 12 MMOL/L (ref 2–14)
BASOPHILS # BLD: 0.02 K/UL (ref 0–0.1)
BASOPHILS NFR BLD: 0.4 % (ref 0–1)
BUN SERPL-MCNC: 13 MG/DL (ref 8–23)
BUN/CREAT SERPL: 15 (ref 12–20)
CALCIUM SERPL-MCNC: 8.8 MG/DL (ref 8.8–10.2)
CHLORIDE SERPL-SCNC: 105 MMOL/L (ref 98–107)
CO2 SERPL-SCNC: 25 MMOL/L (ref 20–29)
CREAT SERPL-MCNC: 0.82 MG/DL (ref 0.6–1)
DIFFERENTIAL METHOD BLD: NORMAL
EOSINOPHIL # BLD: 0.11 K/UL (ref 0–0.4)
EOSINOPHIL NFR BLD: 2.1 % (ref 0–7)
ERYTHROCYTE [DISTWIDTH] IN BLOOD BY AUTOMATED COUNT: 13 % (ref 11.5–14.5)
GLUCOSE SERPL-MCNC: 84 MG/DL (ref 65–100)
HCT VFR BLD AUTO: 39.7 % (ref 35–47)
HGB BLD-MCNC: 12.8 G/DL (ref 11.5–16)
IMM GRANULOCYTES # BLD AUTO: 0.01 K/UL (ref 0–0.04)
IMM GRANULOCYTES NFR BLD AUTO: 0.2 % (ref 0–0.5)
LYMPHOCYTES # BLD: 1.39 K/UL (ref 0.8–3.5)
LYMPHOCYTES NFR BLD: 26.1 % (ref 12–49)
MCH RBC QN AUTO: 27.4 PG (ref 26–34)
MCHC RBC AUTO-ENTMCNC: 32.2 G/DL (ref 30–36.5)
MCV RBC AUTO: 84.8 FL (ref 80–99)
MONOCYTES # BLD: 0.38 K/UL (ref 0–1)
MONOCYTES NFR BLD: 7.1 % (ref 5–13)
NEUTS SEG # BLD: 3.42 K/UL (ref 1.8–8)
NEUTS SEG NFR BLD: 64.1 % (ref 32–75)
NRBC # BLD: 0 K/UL (ref 0–0.01)
NRBC BLD-RTO: 0 PER 100 WBC
PLATELET # BLD AUTO: 197 K/UL (ref 150–400)
PMV BLD AUTO: 11.4 FL (ref 8.9–12.9)
POTASSIUM SERPL-SCNC: 3.9 MMOL/L (ref 3.5–5.1)
RBC # BLD AUTO: 4.68 M/UL (ref 3.8–5.2)
SODIUM SERPL-SCNC: 142 MMOL/L (ref 136–145)
WBC # BLD AUTO: 5.3 K/UL (ref 3.6–11)

## 2025-08-06 PROCEDURE — 97112 NEUROMUSCULAR REEDUCATION: CPT

## 2025-08-06 PROCEDURE — 80048 BASIC METABOLIC PNL TOTAL CA: CPT

## 2025-08-06 PROCEDURE — 97116 GAIT TRAINING THERAPY: CPT

## 2025-08-06 PROCEDURE — 6370000000 HC RX 637 (ALT 250 FOR IP): Performed by: FAMILY MEDICINE

## 2025-08-06 PROCEDURE — 2500000003 HC RX 250 WO HCPCS: Performed by: NURSE PRACTITIONER

## 2025-08-06 PROCEDURE — 97535 SELF CARE MNGMENT TRAINING: CPT

## 2025-08-06 PROCEDURE — 92526 ORAL FUNCTION THERAPY: CPT

## 2025-08-06 PROCEDURE — 97530 THERAPEUTIC ACTIVITIES: CPT

## 2025-08-06 PROCEDURE — 92507 TX SP LANG VOICE COMM INDIV: CPT

## 2025-08-06 PROCEDURE — 85025 COMPLETE CBC W/AUTO DIFF WBC: CPT

## 2025-08-06 PROCEDURE — 94760 N-INVAS EAR/PLS OXIMETRY 1: CPT

## 2025-08-06 RX ORDER — ASPIRIN 81 MG/1
81 TABLET ORAL DAILY
Qty: 30 TABLET | Refills: 0 | OUTPATIENT
Start: 2025-08-11

## 2025-08-06 RX ORDER — ROSUVASTATIN CALCIUM 40 MG/1
40 TABLET, COATED ORAL NIGHTLY
Qty: 30 TABLET | Refills: 3 | Status: SHIPPED | OUTPATIENT
Start: 2025-08-06

## 2025-08-06 RX ORDER — ROSUVASTATIN CALCIUM 40 MG/1
40 TABLET, COATED ORAL NIGHTLY
Qty: 30 TABLET | Refills: 3 | OUTPATIENT
Start: 2025-08-06

## 2025-08-06 RX ORDER — ASPIRIN 81 MG/1
81 TABLET ORAL DAILY
Qty: 30 TABLET | Refills: 0 | Status: SHIPPED | OUTPATIENT
Start: 2025-08-11

## 2025-08-06 RX ORDER — ACETAMINOPHEN 325 MG/1
650 TABLET ORAL EVERY 4 HOURS PRN
Status: DISCONTINUED | OUTPATIENT
Start: 2025-08-06 | End: 2025-08-06 | Stop reason: HOSPADM

## 2025-08-06 RX ADMIN — SODIUM CHLORIDE, PRESERVATIVE FREE 10 ML: 5 INJECTION INTRAVENOUS at 09:11

## 2025-08-06 RX ADMIN — ACETAMINOPHEN 650 MG: 325 TABLET ORAL at 09:07

## 2025-08-06 ASSESSMENT — PAIN DESCRIPTION - LOCATION: LOCATION: BACK

## 2025-08-06 ASSESSMENT — PAIN SCALES - GENERAL: PAINLEVEL_OUTOF10: 8

## 2025-08-06 ASSESSMENT — PAIN DESCRIPTION - DESCRIPTORS: DESCRIPTORS: ACHING

## 2025-08-12 ENCOUNTER — TELEPHONE (OUTPATIENT)
Age: 75
End: 2025-08-12

## 2025-08-15 ENCOUNTER — HOSPITAL ENCOUNTER (OUTPATIENT)
Facility: HOSPITAL | Age: 75
Setting detail: SPECIMEN
Discharge: HOME | End: 2025-08-18

## 2025-08-15 LAB
ALBUMIN SERPL-MCNC: 3 G/DL (ref 3.5–5)
ALBUMIN/GLOB SERPL: 0.8 (ref 1.1–2.2)
ALP SERPL-CCNC: 85 U/L (ref 45–117)
ALT SERPL-CCNC: 33 U/L (ref 12–78)
ANION GAP SERPL CALC-SCNC: 6 MMOL/L (ref 2–12)
AST SERPL W P-5'-P-CCNC: 25 U/L (ref 15–37)
BILIRUB SERPL-MCNC: 0.7 MG/DL (ref 0.2–1)
BUN SERPL-MCNC: 17 MG/DL (ref 6–20)
BUN/CREAT SERPL: 17 (ref 12–20)
CA-I BLD-MCNC: 9.7 MG/DL (ref 8.5–10.1)
CHLORIDE SERPL-SCNC: 107 MMOL/L (ref 97–108)
CO2 SERPL-SCNC: 26 MMOL/L (ref 21–32)
CREAT SERPL-MCNC: 1.01 MG/DL (ref 0.55–1.02)
GLOBULIN SER CALC-MCNC: 3.9 G/DL (ref 2–4)
GLUCOSE SERPL-MCNC: 98 MG/DL (ref 65–100)
POTASSIUM SERPL-SCNC: 4 MMOL/L (ref 3.5–5.1)
PROT SERPL-MCNC: 6.9 G/DL (ref 6.4–8.2)
SODIUM SERPL-SCNC: 139 MMOL/L (ref 136–145)

## 2025-08-15 PROCEDURE — 80053 COMPREHEN METABOLIC PANEL: CPT

## 2025-08-20 ENCOUNTER — OFFICE VISIT (OUTPATIENT)
Age: 75
End: 2025-08-20

## 2025-08-20 VITALS
DIASTOLIC BLOOD PRESSURE: 68 MMHG | WEIGHT: 218.8 LBS | OXYGEN SATURATION: 100 % | HEIGHT: 67 IN | TEMPERATURE: 97.7 F | HEART RATE: 66 BPM | RESPIRATION RATE: 12 BRPM | BODY MASS INDEX: 34.34 KG/M2 | SYSTOLIC BLOOD PRESSURE: 132 MMHG

## 2025-08-20 DIAGNOSIS — N18.31 CHRONIC KIDNEY DISEASE, STAGE 3A (HCC): ICD-10-CM

## 2025-08-20 DIAGNOSIS — Z09 HOSPITAL DISCHARGE FOLLOW-UP: Primary | ICD-10-CM

## 2025-08-20 DIAGNOSIS — I10 BENIGN ESSENTIAL HYPERTENSION: ICD-10-CM

## 2025-08-20 DIAGNOSIS — I63.9 ACUTE CVA (CEREBROVASCULAR ACCIDENT) (HCC): ICD-10-CM

## 2025-08-20 RX ORDER — AMLODIPINE BESYLATE 5 MG/1
5 TABLET ORAL DAILY
Qty: 90 TABLET | Refills: 1 | Status: SHIPPED | OUTPATIENT
Start: 2025-08-20

## 2025-08-20 RX ORDER — AMLODIPINE BESYLATE 5 MG/1
5 TABLET ORAL DAILY
COMMUNITY
End: 2025-08-20 | Stop reason: SDUPTHER

## 2025-08-20 RX ORDER — SODIUM BICARBONATE 650 MG/1
325 TABLET ORAL DAILY
Qty: 45 TABLET | Refills: 1 | Status: SHIPPED | OUTPATIENT
Start: 2025-08-20

## 2025-08-20 RX ORDER — SODIUM BICARBONATE 650 MG/1
325 TABLET ORAL DAILY
COMMUNITY
Start: 2025-08-12 | End: 2025-08-20 | Stop reason: SDUPTHER

## 2025-08-20 RX ORDER — ATORVASTATIN CALCIUM 40 MG/1
80 TABLET, FILM COATED ORAL
Qty: 180 TABLET | Refills: 1 | Status: SHIPPED | OUTPATIENT
Start: 2025-08-20

## 2025-08-20 RX ORDER — VITAMIN B COMPLEX
1 CAPSULE ORAL DAILY
COMMUNITY

## 2025-08-20 RX ORDER — ATORVASTATIN CALCIUM 40 MG/1
80 TABLET, FILM COATED ORAL DAILY
COMMUNITY
End: 2025-08-20 | Stop reason: SDUPTHER

## 2025-08-20 ASSESSMENT — ENCOUNTER SYMPTOMS
SHORTNESS OF BREATH: 0
BLOOD IN STOOL: 0
GASTROINTESTINAL NEGATIVE: 1
DIARRHEA: 0
NAUSEA: 0
RESPIRATORY NEGATIVE: 1
VOMITING: 0
ABDOMINAL PAIN: 0
CONSTIPATION: 0

## 2025-09-05 ENCOUNTER — TELEPHONE (OUTPATIENT)
Age: 75
End: 2025-09-05